# Patient Record
Sex: MALE | Race: WHITE | Employment: FULL TIME | URBAN - METROPOLITAN AREA
[De-identification: names, ages, dates, MRNs, and addresses within clinical notes are randomized per-mention and may not be internally consistent; named-entity substitution may affect disease eponyms.]

---

## 2020-09-22 ENCOUNTER — OFFICE VISIT (OUTPATIENT)
Dept: ENT CLINIC | Age: 30
End: 2020-09-22
Payer: COMMERCIAL

## 2020-09-22 VITALS — RESPIRATION RATE: 20 BRPM | BODY MASS INDEX: 19.95 KG/M2 | HEIGHT: 67 IN | WEIGHT: 127.1 LBS

## 2020-09-22 PROCEDURE — 99203 OFFICE O/P NEW LOW 30 MIN: CPT | Performed by: OTOLARYNGOLOGY

## 2020-09-22 RX ORDER — AZELASTINE 1 MG/ML
2 SPRAY, METERED NASAL 2 TIMES DAILY
Qty: 1 BOTTLE | Refills: 1 | Status: SHIPPED
Start: 2020-09-22 | End: 2022-04-22

## 2020-09-22 ASSESSMENT — ENCOUNTER SYMPTOMS
SINUS PRESSURE: 1
RHINORRHEA: 1
SINUS PAIN: 1

## 2020-09-22 NOTE — PROGRESS NOTES
DEPARTMENT OF OTOLARYNGOLOGY   HISTORY AND PHYSICAL      PATIENT: Yaima Pride THU:[birth year] (27 y.o.)   DATE: September 22, 2020  REFERRED BY: No referring provider defined for this encounter. HISTORY OBTAINED FROM:  patient    CHIEF COMPLAINT:  had concerns including Other (stops breathing throughout the the night for  seconds or 30 seconds or more does not currently see a sleep doctor but has had sinus issues since he was a little kid). HISTORY OF PRESENT ILLNESS:                                                                                        Yaima Pride is a(n) 26 y.o. male presents to the office today as a new patient for evaluation of his sinus congestion. Patient states that he has near daily sinus congestion and drainage that is worse at night. Reports migraine associated headaches as a result of congestion 1-2x per month. Has only tried OTC nasal decongestant. Reports having sinus congestion as a child and underwent imaging but has not had any imaging recently. Reports constant postnasal drip. Denies any specific allergen. Past Medical History:   Diagnosis Date    Asthma     childhood    Kidney stones     Murmur     patient states this was as a child, he no longer has murmur        History reviewed. No pertinent surgical history.       Current Outpatient Medications:     azelastine (ASTELIN) 0.1 % nasal spray, 2 sprays by Nasal route 2 times daily Use in each nostril as directed, Disp: 1 Bottle, Rfl: 1    tamsulosin (FLOMAX) 0.4 MG capsule, Take 1 capsule by mouth daily, Disp: 10 capsule, Rfl: 11    vitamin D (CHOLECALCIFEROL) 1000 UNIT TABS tablet, Take 1,000 Units by mouth daily, Disp: , Rfl:     Biotin 1000 MCG TABS, Take 1 tablet by mouth daily, Disp: , Rfl:     Allergies   Allergen Reactions    Penicillins Anaphylaxis       Family History   Problem Relation Age of Onset    No Known Problems Mother        Social History     Socioeconomic History    Marital status: PHYSICAL EXAM:                                                                                                                Resp 20   Ht 5' 7\" (1.702 m)   Wt 127 lb 1.6 oz (57.7 kg)   BMI 19.91 kg/m²   Physical Exam  Constitutional:       Appearance: Normal appearance. He is normal weight. HENT:      Head: Normocephalic and atraumatic. Right Ear: Tympanic membrane, ear canal and external ear normal.      Left Ear: Tympanic membrane, ear canal and external ear normal.      Nose: Congestion present. No rhinorrhea. Comments: Slight septal deviation to the left     Mouth/Throat:      Mouth: Mucous membranes are moist.      Pharynx: Oropharynx is clear. Eyes:      Pupils: Pupils are equal, round, and reactive to light. Neurological:      Mental Status: He is alert. Constitutional: Appears well-developed and well-nourished. Appears as stated age. Eyes: Lids andlashes normal, pupils equal, extra ocular muscles intact, sclera clear   ENT: Normocephalic, without obvious abnormality, atraumatic, sinuses nontender on palpation, external ears without lesions, oral pharynx with moist mucus membranes, tonsils without erythema or exudates, gums normal and good dentition. Neck:  Supple, symmetrical, trachea midline, no adenopathy, thyroid symmetric, not enlarged and no tenderness, skin normal   Respiratory: No increased work of breathing. Nostridor or wheezes   Cardiovascular: Regular rate   Skin: Warm and dry   Neurologic:  Alert and oriented     ASSESSMENT ANDPLAN:                                                                                                  Diagnosis Orders   1. Chronic sinusitis, unspecified location         26 y.o. [facility] history of  presents with  possible secondary to     · Prescribed azelastine spary, 2 sprays each nostril bid  · Follow up in 3 months or sooner if symptoms acutelyexacerbate    Patient was seen, examined and discussed with attending physician.      Sophie Jones Juanpablo Broderick, MS-IV    [facility] ([facility])  OtolaryngologyResidency  9/22/2020  8:39 AM           Sierra Panda  1990      I have discussed the case, including pertinent history and exam findings with the resident. I have seen and examined the patient and the key elements of the encounter have been performed by me. I agree with the assessment, plan and orders as documented by the resident. Patient here for follow up of medical problems. Remainder of medical problems as per resident note.       [address], DO  10/8/20

## 2022-04-22 ENCOUNTER — OFFICE VISIT (OUTPATIENT)
Dept: ENT CLINIC | Age: 32
End: 2022-04-22
Payer: COMMERCIAL

## 2022-04-22 VITALS
HEART RATE: 59 BPM | DIASTOLIC BLOOD PRESSURE: 77 MMHG | HEIGHT: 67 IN | BODY MASS INDEX: 20.09 KG/M2 | SYSTOLIC BLOOD PRESSURE: 132 MMHG | WEIGHT: 128 LBS

## 2022-04-22 DIAGNOSIS — J34.2 DNS (DEVIATED NASAL SEPTUM): Primary | ICD-10-CM

## 2022-04-22 DIAGNOSIS — J30.9 ALLERGIC RHINITIS, UNSPECIFIED SEASONALITY, UNSPECIFIED TRIGGER: ICD-10-CM

## 2022-04-22 DIAGNOSIS — R09.81 NASAL CONGESTION: ICD-10-CM

## 2022-04-22 PROCEDURE — 99204 OFFICE O/P NEW MOD 45 MIN: CPT | Performed by: NURSE PRACTITIONER

## 2022-04-22 RX ORDER — FLUTICASONE PROPIONATE 50 MCG
2 SPRAY, SUSPENSION (ML) NASAL DAILY
Qty: 16 G | Refills: 1 | Status: SHIPPED | OUTPATIENT
Start: 2022-04-22

## 2022-04-22 ASSESSMENT — ENCOUNTER SYMPTOMS
SHORTNESS OF BREATH: 0
RHINORRHEA: 1
STRIDOR: 0
RESPIRATORY NEGATIVE: 1
EYES NEGATIVE: 1
SINUS PRESSURE: 1

## 2022-04-22 NOTE — PROGRESS NOTES
and palpitations. Endocrine: Negative. Musculoskeletal: Negative. Skin: Negative. Neurological: Negative. Negative for dizziness. Hematological: Negative. Psychiatric/Behavioral: Negative. /77   Pulse 59   Ht 5' 7\" (1.702 m)   Wt 128 lb (58.1 kg)   BMI 20.05 kg/m²   Physical Exam  Constitutional:       Appearance: Normal appearance. HENT:      Head: Normocephalic. Right Ear: Tympanic membrane, ear canal and external ear normal.      Left Ear: Tympanic membrane, ear canal and external ear normal.      Nose: Septal deviation present. Right Turbinates: Pale. Left Turbinates: Pale. Mouth/Throat:      Lips: Pink. Mouth: Mucous membranes are moist.     Eyes:      Conjunctiva/sclera: Conjunctivae normal.      Pupils: Pupils are equal, round, and reactive to light. Cardiovascular:      Rate and Rhythm: Normal rate and regular rhythm. Pulses: Normal pulses. Pulmonary:      Effort: Pulmonary effort is normal. No respiratory distress. Breath sounds: No stridor. Musculoskeletal:         General: Normal range of motion. Cervical back: Normal range of motion. No rigidity. No muscular tenderness. Skin:     General: Skin is warm and dry. Neurological:      General: No focal deficit present. Mental Status: He is alert and oriented to person, place, and time. Psychiatric:         Mood and Affect: Mood normal.         Behavior: Behavior normal.         Thought Content: Thought content normal.         Judgment: Judgment normal.         IMPRESSION/PLAN:    Cici oRsa was seen today for new patient. Diagnoses and all orders for this visit:    DNS (deviated nasal septum)  -     CT SINUS WO CONTRAST; Future    Allergic rhinitis, unspecified seasonality, unspecified trigger    Nasal congestion  -     CT SINUS WO CONTRAST;  Future    Other orders  -     fluticasone (FLONASE) 50 MCG/ACT nasal spray; 2 sprays by Each Nostril route daily      Patient demonstrates considerable leftward nasal septal deviation and will undergo a CT scan at this time for further evaluation. Patient states he has used nasal strips in the past with good relief with the patient possibly benefiting from [place] aw[place] or batten grafts in the future depending on need for other surgical intervention. At this time he will be placed on Flonase, 2 sprays each nostril once daily for allergic rhinitis and hypertrophy of the inferior turbinates. Also recommended for him to begin nasal saline spray, 2 sprays each nostril 3-4 times daily as well as nasal irrigation once daily. He will follow-up in 6 weeks. He is instructed to call with any new or worsening symptoms prior to his next appointment.         Mandy Cox, JASSI, FNP-C  [facility], Nose and Throat    The information contained in this note has been dictated using drug and medical speech recognition software and may contain errors

## 2022-04-29 ENCOUNTER — HOSPITAL ENCOUNTER (OUTPATIENT)
Dept: CT IMAGING | Age: 32
Discharge: HOME | End: 2022-05-01
Payer: COMMERCIAL

## 2022-04-29 DIAGNOSIS — R09.81 NASAL CONGESTION: ICD-10-CM

## 2022-04-29 DIAGNOSIS — J34.2 DNS (DEVIATED NASAL SEPTUM): ICD-10-CM

## 2022-04-29 PROCEDURE — 70486 CT MAXILLOFACIAL W/O DYE: CPT

## 2022-05-13 ENCOUNTER — TELEPHONE (OUTPATIENT)
Dept: ENT CLINIC | Age: 32
End: 2022-05-13

## 2022-05-13 NOTE — TELEPHONE ENCOUNTER
Shows DNS with enlarged turbinates and right maxillary sinusitis.   Will discuss options at his follow up

## 2022-06-03 ENCOUNTER — OFFICE VISIT (OUTPATIENT)
Dept: ENT CLINIC | Age: 32
End: 2022-06-03
Payer: COMMERCIAL

## 2022-06-03 VITALS
HEIGHT: 67 IN | HEART RATE: 51 BPM | BODY MASS INDEX: 20.09 KG/M2 | DIASTOLIC BLOOD PRESSURE: 76 MMHG | SYSTOLIC BLOOD PRESSURE: 127 MMHG | WEIGHT: 128 LBS

## 2022-06-03 DIAGNOSIS — J34.2 DNS (DEVIATED NASAL SEPTUM): Primary | ICD-10-CM

## 2022-06-03 DIAGNOSIS — J32.0 CHRONIC RIGHT MAXILLARY SINUSITIS: ICD-10-CM

## 2022-06-03 DIAGNOSIS — J30.9 ALLERGIC RHINITIS, UNSPECIFIED SEASONALITY, UNSPECIFIED TRIGGER: ICD-10-CM

## 2022-06-03 DIAGNOSIS — J34.89 NASAL VALVE COLLAPSE: ICD-10-CM

## 2022-06-03 PROCEDURE — 99214 OFFICE O/P EST MOD 30 MIN: CPT | Performed by: NURSE PRACTITIONER

## 2022-06-03 RX ORDER — AZELASTINE 1 MG/ML
1-2 SPRAY, METERED NASAL 2 TIMES DAILY PRN
Qty: 30 ML | Refills: 1 | Status: SHIPPED | OUTPATIENT
Start: 2022-06-03

## 2022-06-03 ASSESSMENT — ENCOUNTER SYMPTOMS
STRIDOR: 0
RHINORRHEA: 1
SINUS PRESSURE: 1
EYES NEGATIVE: 1
RESPIRATORY NEGATIVE: 1
SHORTNESS OF BREATH: 0

## 2022-06-03 NOTE — PATIENT INSTRUCTIONS
SURGERY:_____/_____/_____    Nothing to eat or drink after midnight the night before surgery unless surgery is at [facility] or otherwise instructed by the hospital.    DO NOT TAKE ANY ASPIRIN PRODUCTS 7 days prior to surgery. Tylenol only. No Advil, Motrin, Aleve, or Ibuprofen. IF YOU ARE ON BLOOD THINNERS (PLAVIX, COUMADIN, ELIQUIS ETC) THESE WILL ALSO NEED TO BE HELD. Any illegal drugs in your system (including Marijuana even if legally prescribed) will result in your surgery being cancelled. Please be sure to check with our office or the hospital on time frame for the drugs to be out of your system. SHOULD YOUR INSURANCE CHANGE AT ANY TIME YOU MUST CONTACT OUR OFFICE. FAILURE TO DO SO MAY RESULT IN YOUR SURGERY BEING RESCHEDULED OR YOU MAY BE CHARGED AS SELF-PAY. Due to the high demand for surgery at our practice, if you cancel or reschedule your surgery two (2) times we may not reschedule you. If you need FMLA or Short Term Disability paperwork completed for your surgery, please complete your portion, ensure your name and date of birth are on them and fax them to [phone] asap. Paperwork can take up to 2 weeks to be completed. Per current [facility] protocols, COVID Testing is NOT required prior to procedure UNLESS you are symptomatic. (Vaccinated or Unvaccinated)- [facility] requires COVID Testing 72 hours prior to surgery. If you need medical clearance, you are responsible to contact your physician(s) to schedule the appointment. If clearance is not completed within 30 days of your surgery it may be cancelled. Our office will fax the appropriate forms that need to be completed to your physician(s).     The location of your surgery will call you the day prior to your surgery date to let you know what time you have to be there and any other details. (they usually don't call until late afternoon- early evening.)- IF YOU HAVE QUESTIONS REGARDING THE TIME OF YOUR SURGERY, PLEASE CALL THE FACILITY YOU ARE SCHEDULED AT.     ·       [address], [address] [facility], [facility], [address] [place] [address] Dr will call you a couple days prior to surgery and give you further instructions, if you have any questions, you can reach them at [phone]    ·       [address], [address], KENDY FERNÁNDEZ [facility], [address] [place] [address] Dr will call you a couple days prior to surgery and give you further instructions, if you have any questions, you can reach them at [phone]    ·       [facility], [address] [facility], [address] [place] [address] Dr will call you a couple days prior to surgery and give you further instructions, if you have any questions, you can reach them at [phone]     ·       [facility]), [address],  KENDY FERNÁNDEZ [facility], [address] [place] [address] will call you a couple days prior to surgery and give you further instructions, if you have any questions, you can reach them at [phone]      Pre-Surgery/Anesthesia Video ([facility] ONLY)  Located on [address]:  1. Scroll over Health Information  2. Select Audio and Video  3. Select [employer]  4. Select Your child and Anesthesia  5.  Select Pre surgery [facility]    FOOD RESTRICTIONS--[facility] ONLY  Solid Food/Milk Products --------- Stop 8 hours prior to Surgery  Formula --------- Stop 6 hours prior to Surgery  Breast Milk ------- Stop 4 hours prior to Surgery  Clear liquids (water, Gatorade, Pedialyte) - Stop 2 hours prior to Surgery

## 2022-06-03 NOTE — PROGRESS NOTES
[facility] Otolaryngology  Dr. Chika Irwin. Kelsey Hussein. Ms.Ed        Patient Name:  Ellen Landaverde  :  1990     CHIEF C/O:    Chief Complaint   Patient presents with    Follow-up     6 wk sinus CT,        HISTORY OBTAINED FROM:  patient    HISTORY OF PRESENT ILLNESS:       Ofelia Garg is a 27y.o. year old male, here today for follow up of sinus congestion and CT sinus results. Patient was last seen 6 weeks ago and placed on Flonase. He states since starting the medication he did notice mild improvement in his congestion symptoms with decreased sinus pressure. CT of the sinuses do show right maxillary sinusitis with leftward nasal septal deviation. Patient denies any recent fevers or recent antibiotics. He denies any significant rhinorrhea or postnasal drainage at this time. He continues to have periods of significant congestion through the left nostril throughout the day. Patient is interested in surgical intervention if it is an option. Past Medical History:   Diagnosis Date    Asthma     childhood    Kidney stones     Murmur     patient states this was as a child, he no longer has murmur     History reviewed. No pertinent surgical history. Current Outpatient Medications:     azelastine (ASTELIN) 0.1 % nasal spray, 1-2 sprays by Nasal route 2 times daily as needed for Rhinitis Use in each nostril as directed, Disp: 30 mL, Rfl: 1    fluticasone (FLONASE) 50 MCG/ACT nasal spray, 2 sprays by Each Nostril route daily, Disp: 16 g, Rfl: 1    vitamin D (CHOLECALCIFEROL) 1000 UNIT TABS tablet, Take 1,000 Units by mouth daily, Disp: , Rfl:   Penicillins  Social History     Tobacco Use    Smoking status: Never Smoker    Smokeless tobacco: Never Used   Substance Use Topics    Alcohol use: Yes     Comment: soc.  Drug use: No     Family History   Problem Relation Age of Onset    No Known Problems Mother        Review of Systems   Constitutional: Negative.   Negative for activity change and appetite change. HENT: Positive for congestion, postnasal drip, rhinorrhea and sinus pressure. Eyes: Negative. Respiratory: Negative. Negative for shortness of breath and stridor. Cardiovascular: Negative. Negative for chest pain and palpitations. Endocrine: Negative. Musculoskeletal: Negative. Skin: Negative. Neurological: Negative. Negative for dizziness. Hematological: Negative. Psychiatric/Behavioral: Negative. /76   Pulse 51   Ht 5' 7\" (1.702 m)   Wt 128 lb (58.1 kg)   BMI 20.05 kg/m²   Physical Exam  Constitutional:       Appearance: Normal appearance. HENT:      Head: Normocephalic. Right Ear: Tympanic membrane, ear canal and external ear normal.      Left Ear: Tympanic membrane, ear canal and external ear normal.      Nose: Septal deviation present. Right Turbinates: Pale. Left Turbinates: Pale. Mouth/Throat:      Lips: Pink. Mouth: Mucous membranes are moist.     Eyes:      Conjunctiva/sclera: Conjunctivae normal.      Pupils: Pupils are equal, round, and reactive to light. Cardiovascular:      Rate and Rhythm: Normal rate and regular rhythm. Pulses: Normal pulses. Pulmonary:      Effort: Pulmonary effort is normal. No respiratory distress. Breath sounds: No stridor. Musculoskeletal:         General: Normal range of motion. Cervical back: Normal range of motion. No rigidity. No muscular tenderness. Skin:     General: Skin is warm and dry. Neurological:      General: No focal deficit present. Mental Status: He is alert and oriented to person, place, and time. Psychiatric:         Mood and Affect: Mood normal.         Behavior: Behavior normal.         Thought Content: Thought content normal.         Judgment: Judgment normal.     CT Sinus:      Impression   1. Right maxillary sinusitis. 2. Mild leftward deviation of the nasal septum.              IMPRESSION/PLAN:    Timothy Cerrato was seen today for follow-up. Diagnoses and all orders for this visit:    DNS (deviated nasal septum)    Chronic right maxillary sinusitis    Nasal valve collapse    Allergic rhinitis, unspecified seasonality, unspecified trigger    Other orders  -     azelastine (ASTELIN) 0.1 % nasal spray; 1-2 sprays by Nasal route 2 times daily as needed for Rhinitis Use in each nostril as directed    Patient will continue with Flonase, 2 sprays each nostril once daily and be given Astelin spray, 1 to 2 sprays each nostril twice daily as needed for continuing congestion symptoms. CT of the sinuses is reviewed with the patient showing chronic right maxillary sinusitis with leftward nasal septal deviation. Patient continues to have symptoms of the left nasal valve collapse. At this time it is recommended that the patient may benefit from functional endoscopic sinus surgery with nasal septoplasty and submucosal resection of the inferior turbinates. Patient may also benefit from possible left batten graft versus left Latera. Risks and benefits of the procedure are discussed with the patient who wishes to proceed. He will be scheduled with Dr. Demetrio Miller at [facility] surgery center for his procedure with follow-up 1 week after his procedure. He is instructed to call with any new or worsening of symptoms prior to this time.       Ese Martinez, JASSI, FNP-C  8 [facility], Nose and Throat    The information contained in this note has been dictated using drug and medical speech recognition software and may contain errors

## 2022-06-23 ENCOUNTER — TELEPHONE (OUTPATIENT)
Dept: ENT CLINIC | Age: 32
End: 2022-06-23

## 2022-06-23 NOTE — TELEPHONE ENCOUNTER
Prior Authorization Form:      DEMOGRAPHICS:                     Patient Name:  Ileana Freeman  Patient :  1990            Insurance:  Payor: Mesha Iniguez / Plan: Mesha Iniguez [place] LOCAL / Product Type: *No Product type* /   Insurance ID Number:    Payor/Plan Subscr  Sex Relation Sub. Ins. ID Effective Group Num   1.  [employer] [place]* THOM JAMIL* 1990 Male Self [id number]* 3/16/22 [id number]                                    [id number]         DIAGNOSIS & PROCEDURE:                       Procedure/Operation: FUNCTIONAL ENDOSCOPIC SINUS SURGERY, SUBMUCOSAL RESECTION OF INFERIOR NASAL TURBINATES, SEPTOPLASTY, LEFT LATERA           CPT Code: 12032 83890 75849 13578 09544 34108 09091 47977 52922 37999 77049 [id number] 42309    Diagnosis:  RIGHT MAXILLARY SINUSITIS, DEVIATED NASAL SEPTUM, NASAL VALVE COLLAPSE, HYPERTROPHY OF INFERIOR NASAL TURBINATES    ICD10 Code: J32.0 J34.2 J34.89 J34.3    Location:  [facility]    Surgeon:  Cori Ferro INFORMATION:                          Date: 23    Time: N/A              Anesthesia:  General                                                       Status:  Outpatient        Special Comments:  N/A   **RESCHEDULE FROM 12/15/22**    Electronically signed by Enoch Jimenez MA on 2022 at 10:12 AM

## 2022-12-08 ENCOUNTER — PREP FOR PROCEDURE (OUTPATIENT)
Dept: ENT CLINIC | Age: 32
End: 2022-12-08

## 2022-12-08 PROBLEM — J34.829 NASAL VALVE COLLAPSE: Status: ACTIVE | Noted: 2022-12-08

## 2022-12-08 PROBLEM — J34.3 HYPERTROPHY OF NASAL TURBINATES: Status: ACTIVE | Noted: 2022-12-08

## 2022-12-08 PROBLEM — J34.2 DNS (DEVIATED NASAL SEPTUM): Status: ACTIVE | Noted: 2022-12-08

## 2022-12-08 PROBLEM — J32.0 CHRONIC RIGHT MAXILLARY SINUSITIS: Status: ACTIVE | Noted: 2022-12-08

## 2022-12-08 PROBLEM — J34.89 NASAL VALVE COLLAPSE: Status: ACTIVE | Noted: 2022-12-08

## 2022-12-08 PROBLEM — M95.0 NASAL VALVE COLLAPSE: Status: ACTIVE | Noted: 2022-12-08

## 2023-01-11 ENCOUNTER — ANESTHESIA EVENT (OUTPATIENT)
Dept: OPERATING ROOM | Age: 33
End: 2023-01-11
Payer: COMMERCIAL

## 2023-01-11 NOTE — ANESTHESIA PRE PROCEDURE
Department of Anesthesiology  Preprocedure Note       Name:  Mira Summers   Age:  27 y.o.  :  1990                                          MRN:  [id number]         Date:  2023      Surgeon: Dorothy Luevano):  Dori Washington DO    Procedure: Procedure(s):  FUNCTIONAL ENDOSCOPIC SINUS SURGERY  SEPTOPLASTY, SUBMUCOUS RESECTION INFERIOR NASAL TURBINATES  LEFT LATERA    Medications prior to admission:   Prior to Admission medications    Not on File       Current medications:    No current facility-administered medications for this encounter. No current outpatient medications on file. Allergies: Allergies   Allergen Reactions    Penicillins Anaphylaxis       Problem List:    Patient Active Problem List   Diagnosis Code    Chronic right maxillary sinusitis J32.0    DNS (deviated nasal septum) J34.2    Nasal valve collapse J34.89    Hypertrophy of nasal turbinates J34.3       Past Medical History:        Diagnosis Date    Asthma     childhood    Kidney stones     Murmur     patient states this was as a child, he no longer has murmur       Past Surgical History:        Procedure Laterality Date    DENTAL SURGERY      LITHOTRIPSY         Social History:    Social History     Tobacco Use    Smoking status: Never    Smokeless tobacco: Never   Substance Use Topics    Alcohol use: Yes     Comment: rare                                Counseling given: Not Answered      Vital Signs (Current):   Vitals:    23 1542   Weight: 130 lb (59 kg)   Height: 5' 7\" (1.702 m)                                              BP Readings from Last 3 Encounters:   22 127/76   22 132/77   17 129/71       NPO Status:  >8 hours                                                                               BMI:   Wt Readings from Last 3 Encounters:   22 128 lb (58.1 kg)   22 128 lb (58.1 kg)   20 127 lb 1.6 oz (57.7 kg)     Body mass index is 20.36 kg/m².     CBC:   Lab Results   Component Value Date/Time    WBC 7.4 04/18/2017 02:00 PM    RBC 4.86 04/18/2017 02:00 PM    HGB 15.6 04/18/2017 02:00 PM    HCT 43.9 04/18/2017 02:00 PM    MCV 90.3 04/18/2017 02:00 PM    RDW 13.0 04/18/2017 02:00 PM     04/18/2017 02:00 PM       CMP:   Lab Results   Component Value Date/Time     04/18/2017 02:00 PM    K 5.5 04/18/2017 02:00 PM     04/18/2017 02:00 PM    CO2 25 04/18/2017 02:00 PM    BUN 18 04/18/2017 02:00 PM    CREATININE 1.1 04/18/2017 02:00 PM    GFRAA >60 04/18/2017 02:00 PM    LABGLOM >60 04/18/2017 02:00 PM    GLUCOSE 86 04/18/2017 02:00 PM    PROT 7.1 04/18/2017 02:00 PM    CALCIUM 10.0 04/18/2017 02:00 PM    BILITOT 0.7 04/18/2017 02:00 PM    ALKPHOS 70 04/18/2017 02:00 PM    AST 23 04/18/2017 02:00 PM    ALT 13 04/18/2017 02:00 PM       POC Tests: No results for input(s): POCGLU, POCNA, POCK, POCCL, POCBUN, POCHEMO, POCHCT in the last 72 hours.     Coags: No results found for: PROTIME, INR, APTT    HCG (If Applicable): No results found for: PREGTESTUR, PREGSERUM, HCG, HCGQUANT     ABGs: No results found for: PHART, PO2ART, OBK3HCW, BJQ0VOZ, BEART, A7EXTWKM     Type & Screen (If Applicable):  No results found for: LABABO, LABRH    Drug/Infectious Status (If Applicable):  No results found for: HIV, HEPCAB    COVID-19 Screening (If Applicable): No results found for: COVID19        Anesthesia Evaluation  Patient summary reviewed and Nursing notes reviewed  Airway: Mallampati: II  TM distance: >3 FB   Neck ROM: full  Mouth opening: > = 3 FB   Dental: normal exam         Pulmonary: breath sounds clear to auscultation  (+) asthma (childhood):                            Cardiovascular:    (+) valvular problems/murmurs:,         Rhythm: regular                      Neuro/Psych:   Negative Neuro/Psych ROS              GI/Hepatic/Renal:   (+) renal disease: kidney stones,           Endo/Other: Negative Endo/Other ROS                    Abdominal:             Vascular: negative vascular ROS. Other Findings:             Андрей Crum MD   1/11/2023    DOS STAFF ADDENDUM:    Pt seen and examined, physical exam updated, chart reviewed including anesthesia, drug and allergy history. H&P reviewed. No interval changes to history or physical examination (unless noted above). NPO status confirmed. Anesthetic plan, risks, benefits, alternatives discussed with patient. Patient verbalized an understanding and agrees to proceed.      Kira Duran DO  Staff Anesthesiologist  11:01 AM

## 2023-01-12 ENCOUNTER — HOSPITAL ENCOUNTER (OUTPATIENT)
Age: 33
Setting detail: OUTPATIENT SURGERY
Discharge: HOME | End: 2023-01-12
Attending: OTOLARYNGOLOGY | Admitting: OTOLARYNGOLOGY
Payer: COMMERCIAL

## 2023-01-12 ENCOUNTER — ANESTHESIA (OUTPATIENT)
Dept: OPERATING ROOM | Age: 33
End: 2023-01-12
Payer: COMMERCIAL

## 2023-01-12 VITALS
OXYGEN SATURATION: 98 % | HEIGHT: 67 IN | HEART RATE: 64 BPM | RESPIRATION RATE: 14 BRPM | TEMPERATURE: 97 F | BODY MASS INDEX: 20.09 KG/M2 | DIASTOLIC BLOOD PRESSURE: 68 MMHG | SYSTOLIC BLOOD PRESSURE: 129 MMHG | WEIGHT: 128 LBS

## 2023-01-12 DIAGNOSIS — J32.0 CHRONIC RIGHT MAXILLARY SINUSITIS: ICD-10-CM

## 2023-01-12 DIAGNOSIS — J34.2 DNS (DEVIATED NASAL SEPTUM): ICD-10-CM

## 2023-01-12 DIAGNOSIS — J34.89 NASAL VALVE COLLAPSE: ICD-10-CM

## 2023-01-12 DIAGNOSIS — G89.18 POST-OP PAIN: Primary | ICD-10-CM

## 2023-01-12 DIAGNOSIS — J34.3 HYPERTROPHY OF NASAL TURBINATES: ICD-10-CM

## 2023-01-12 PROCEDURE — 6360000002 HC RX W HCPCS: Performed by: NURSE ANESTHETIST, CERTIFIED REGISTERED

## 2023-01-12 PROCEDURE — 2720000010 HC SURG SUPPLY STERILE: Performed by: OTOLARYNGOLOGY

## 2023-01-12 PROCEDURE — 2580000003 HC RX 258: Performed by: ANESTHESIOLOGY

## 2023-01-12 PROCEDURE — 30140 RESECT INFERIOR TURBINATE: CPT | Performed by: OTOLARYNGOLOGY

## 2023-01-12 PROCEDURE — 3700000000 HC ANESTHESIA ATTENDED CARE: Performed by: OTOLARYNGOLOGY

## 2023-01-12 PROCEDURE — 31297 NSL/SINS NDSC SURG SPHN SINS: CPT | Performed by: OTOLARYNGOLOGY

## 2023-01-12 PROCEDURE — 2500000003 HC RX 250 WO HCPCS: Performed by: NURSE ANESTHETIST, CERTIFIED REGISTERED

## 2023-01-12 PROCEDURE — 7100000010 HC PHASE II RECOVERY - FIRST 15 MIN: Performed by: OTOLARYNGOLOGY

## 2023-01-12 PROCEDURE — 31295 NSL/SINS NDSC SURG MAX SINS: CPT | Performed by: OTOLARYNGOLOGY

## 2023-01-12 PROCEDURE — 3700000001 HC ADD 15 MINUTES (ANESTHESIA): Performed by: OTOLARYNGOLOGY

## 2023-01-12 PROCEDURE — 31298 NSL/SINS NDSC SURG FRNT&SPHN: CPT | Performed by: OTOLARYNGOLOGY

## 2023-01-12 PROCEDURE — 3600000014 HC SURGERY LEVEL 4 ADDTL 15MIN: Performed by: OTOLARYNGOLOGY

## 2023-01-12 PROCEDURE — 30468 RPR NSL VLV COLLAPSE W/IMPLT: CPT | Performed by: OTOLARYNGOLOGY

## 2023-01-12 PROCEDURE — C1726 CATH, BAL DIL, NON-VASCULAR: HCPCS | Performed by: OTOLARYNGOLOGY

## 2023-01-12 PROCEDURE — 3600000004 HC SURGERY LEVEL 4 BASE: Performed by: OTOLARYNGOLOGY

## 2023-01-12 PROCEDURE — C2625 STENT, NON-COR, TEM W/DEL SY: HCPCS | Performed by: OTOLARYNGOLOGY

## 2023-01-12 PROCEDURE — 7100000011 HC PHASE II RECOVERY - ADDTL 15 MIN: Performed by: OTOLARYNGOLOGY

## 2023-01-12 PROCEDURE — 31254 NSL/SINS NDSC W/PRTL ETHMDCT: CPT | Performed by: OTOLARYNGOLOGY

## 2023-01-12 PROCEDURE — 7100000001 HC PACU RECOVERY - ADDTL 15 MIN: Performed by: OTOLARYNGOLOGY

## 2023-01-12 PROCEDURE — 30520 REPAIR OF NASAL SEPTUM: CPT | Performed by: OTOLARYNGOLOGY

## 2023-01-12 PROCEDURE — 6370000000 HC RX 637 (ALT 250 FOR IP): Performed by: ANESTHESIOLOGY

## 2023-01-12 PROCEDURE — 7100000000 HC PACU RECOVERY - FIRST 15 MIN: Performed by: OTOLARYNGOLOGY

## 2023-01-12 PROCEDURE — 2709999900 HC NON-CHARGEABLE SUPPLY: Performed by: OTOLARYNGOLOGY

## 2023-01-12 DEVICE — IMPLANTABLE DEVICE: Type: IMPLANTABLE DEVICE | Site: NOSE | Status: FUNCTIONAL

## 2023-01-12 DEVICE — PROPEL MINI SINUS IMPLANT
Type: IMPLANTABLE DEVICE | Site: NOSE | Status: FUNCTIONAL
Brand: PROPEL MINI

## 2023-01-12 RX ORDER — PROPOFOL 10 MG/ML
INJECTION, EMULSION INTRAVENOUS PRN
Status: DISCONTINUED | OUTPATIENT
Start: 2023-01-12 | End: 2023-01-12 | Stop reason: SDUPTHER

## 2023-01-12 RX ORDER — SODIUM CHLORIDE 0.9 % (FLUSH) 0.9 %
5-40 SYRINGE (ML) INJECTION EVERY 12 HOURS SCHEDULED
Status: DISCONTINUED | OUTPATIENT
Start: 2023-01-12 | End: 2023-01-12 | Stop reason: HOSPADM

## 2023-01-12 RX ORDER — SODIUM CHLORIDE 0.9 % (FLUSH) 0.9 %
5-40 SYRINGE (ML) INJECTION PRN
Status: DISCONTINUED | OUTPATIENT
Start: 2023-01-12 | End: 2023-01-12 | Stop reason: HOSPADM

## 2023-01-12 RX ORDER — FENTANYL CITRATE 0.05 MG/ML
25 INJECTION, SOLUTION INTRAMUSCULAR; INTRAVENOUS EVERY 5 MIN PRN
Status: DISCONTINUED | OUTPATIENT
Start: 2023-01-12 | End: 2023-01-12 | Stop reason: HOSPADM

## 2023-01-12 RX ORDER — ONDANSETRON 4 MG/1
4 TABLET, FILM COATED ORAL 3 TIMES DAILY PRN
Qty: 15 TABLET | Refills: 0 | Status: SHIPPED | OUTPATIENT
Start: 2023-01-12

## 2023-01-12 RX ORDER — GLYCOPYRROLATE 0.2 MG/ML
INJECTION INTRAMUSCULAR; INTRAVENOUS PRN
Status: DISCONTINUED | OUTPATIENT
Start: 2023-01-12 | End: 2023-01-12 | Stop reason: SDUPTHER

## 2023-01-12 RX ORDER — FENTANYL CITRATE 50 UG/ML
INJECTION, SOLUTION INTRAMUSCULAR; INTRAVENOUS PRN
Status: DISCONTINUED | OUTPATIENT
Start: 2023-01-12 | End: 2023-01-12 | Stop reason: SDUPTHER

## 2023-01-12 RX ORDER — LIDOCAINE HYDROCHLORIDE 20 MG/ML
INJECTION, SOLUTION INTRAVENOUS PRN
Status: DISCONTINUED | OUTPATIENT
Start: 2023-01-12 | End: 2023-01-12 | Stop reason: SDUPTHER

## 2023-01-12 RX ORDER — HYDROCODONE BITARTRATE AND ACETAMINOPHEN 5; 325 MG/1; MG/1
1 TABLET ORAL ONCE
Status: COMPLETED | OUTPATIENT
Start: 2023-01-12 | End: 2023-01-12

## 2023-01-12 RX ORDER — HYDROMORPHONE HYDROCHLORIDE 1 MG/ML
0.5 INJECTION, SOLUTION INTRAMUSCULAR; INTRAVENOUS; SUBCUTANEOUS EVERY 5 MIN PRN
Status: DISCONTINUED | OUTPATIENT
Start: 2023-01-12 | End: 2023-01-12 | Stop reason: HOSPADM

## 2023-01-12 RX ORDER — OXYCODONE HYDROCHLORIDE AND ACETAMINOPHEN 5; 325 MG/1; MG/1
1 TABLET ORAL ONCE
Status: DISCONTINUED | OUTPATIENT
Start: 2023-01-12 | End: 2023-01-12 | Stop reason: HOSPADM

## 2023-01-12 RX ORDER — ROCURONIUM BROMIDE 10 MG/ML
INJECTION, SOLUTION INTRAVENOUS PRN
Status: DISCONTINUED | OUTPATIENT
Start: 2023-01-12 | End: 2023-01-12 | Stop reason: SDUPTHER

## 2023-01-12 RX ORDER — MIDAZOLAM HYDROCHLORIDE 1 MG/ML
INJECTION INTRAMUSCULAR; INTRAVENOUS PRN
Status: DISCONTINUED | OUTPATIENT
Start: 2023-01-12 | End: 2023-01-12 | Stop reason: SDUPTHER

## 2023-01-12 RX ORDER — NEOSTIGMINE METHYLSULFATE 1 MG/ML
INJECTION, SOLUTION INTRAVENOUS PRN
Status: DISCONTINUED | OUTPATIENT
Start: 2023-01-12 | End: 2023-01-12 | Stop reason: SDUPTHER

## 2023-01-12 RX ORDER — ONDANSETRON 2 MG/ML
INJECTION INTRAMUSCULAR; INTRAVENOUS PRN
Status: DISCONTINUED | OUTPATIENT
Start: 2023-01-12 | End: 2023-01-12 | Stop reason: SDUPTHER

## 2023-01-12 RX ORDER — DEXAMETHASONE SODIUM PHOSPHATE 10 MG/ML
INJECTION, SOLUTION INTRAMUSCULAR; INTRAVENOUS PRN
Status: DISCONTINUED | OUTPATIENT
Start: 2023-01-12 | End: 2023-01-12 | Stop reason: SDUPTHER

## 2023-01-12 RX ORDER — SODIUM CHLORIDE, SODIUM LACTATE, POTASSIUM CHLORIDE, CALCIUM CHLORIDE 600; 310; 30; 20 MG/100ML; MG/100ML; MG/100ML; MG/100ML
INJECTION, SOLUTION INTRAVENOUS CONTINUOUS
Status: DISCONTINUED | OUTPATIENT
Start: 2023-01-12 | End: 2023-01-12 | Stop reason: HOSPADM

## 2023-01-12 RX ORDER — DIPHENHYDRAMINE HYDROCHLORIDE 50 MG/ML
INJECTION INTRAMUSCULAR; INTRAVENOUS PRN
Status: DISCONTINUED | OUTPATIENT
Start: 2023-01-12 | End: 2023-01-12 | Stop reason: SDUPTHER

## 2023-01-12 RX ORDER — SODIUM CHLORIDE 9 MG/ML
INJECTION, SOLUTION INTRAVENOUS PRN
Status: DISCONTINUED | OUTPATIENT
Start: 2023-01-12 | End: 2023-01-12 | Stop reason: HOSPADM

## 2023-01-12 RX ORDER — HYDROCODONE BITARTRATE AND ACETAMINOPHEN 5; 325 MG/1; MG/1
1 TABLET ORAL EVERY 6 HOURS PRN
Qty: 28 TABLET | Refills: 0 | Status: SHIPPED | OUTPATIENT
Start: 2023-01-12 | End: 2023-01-19

## 2023-01-12 RX ORDER — KETAMINE HYDROCHLORIDE 10 MG/ML
INJECTION INTRAMUSCULAR; INTRAVENOUS PRN
Status: DISCONTINUED | OUTPATIENT
Start: 2023-01-12 | End: 2023-01-12 | Stop reason: SDUPTHER

## 2023-01-12 RX ADMIN — FENTANYL CITRATE 50 MCG: 50 INJECTION, SOLUTION INTRAMUSCULAR; INTRAVENOUS at 12:41

## 2023-01-12 RX ADMIN — SODIUM CHLORIDE, POTASSIUM CHLORIDE, SODIUM LACTATE AND CALCIUM CHLORIDE: 600; 310; 30; 20 INJECTION, SOLUTION INTRAVENOUS at 10:14

## 2023-01-12 RX ADMIN — SODIUM CHLORIDE, POTASSIUM CHLORIDE, SODIUM LACTATE AND CALCIUM CHLORIDE: 600; 310; 30; 20 INJECTION, SOLUTION INTRAVENOUS at 13:01

## 2023-01-12 RX ADMIN — HYDROCODONE BITARTRATE AND ACETAMINOPHEN 1 TABLET: 5; 325 TABLET ORAL at 15:26

## 2023-01-12 RX ADMIN — ROCURONIUM BROMIDE 50 MG: 10 INJECTION INTRAVENOUS at 12:41

## 2023-01-12 RX ADMIN — LIDOCAINE HYDROCHLORIDE 50 MG: 20 INJECTION, SOLUTION INTRAVENOUS at 12:41

## 2023-01-12 RX ADMIN — MIDAZOLAM 2 MG: 1 INJECTION INTRAMUSCULAR; INTRAVENOUS at 12:33

## 2023-01-12 RX ADMIN — FENTANYL CITRATE 50 MCG: 50 INJECTION, SOLUTION INTRAMUSCULAR; INTRAVENOUS at 13:18

## 2023-01-12 RX ADMIN — DEXAMETHASONE SODIUM PHOSPHATE 10 MG: 10 INJECTION, SOLUTION INTRAMUSCULAR; INTRAVENOUS at 12:58

## 2023-01-12 RX ADMIN — ONDANSETRON 4 MG: 2 INJECTION INTRAMUSCULAR; INTRAVENOUS at 14:06

## 2023-01-12 RX ADMIN — DIPHENHYDRAMINE HYDROCHLORIDE 12.5 MG: 50 INJECTION, SOLUTION INTRAMUSCULAR; INTRAVENOUS at 14:06

## 2023-01-12 RX ADMIN — FENTANYL CITRATE 100 MCG: 50 INJECTION, SOLUTION INTRAMUSCULAR; INTRAVENOUS at 13:27

## 2023-01-12 RX ADMIN — KETAMINE HYDROCHLORIDE 30 MG: 10 INJECTION INTRAMUSCULAR; INTRAVENOUS at 12:41

## 2023-01-12 RX ADMIN — ROCURONIUM BROMIDE 10 MG: 10 INJECTION INTRAVENOUS at 13:42

## 2023-01-12 RX ADMIN — Medication 3 MG: at 14:06

## 2023-01-12 RX ADMIN — PROPOFOL 200 MG: 10 INJECTION, EMULSION INTRAVENOUS at 12:41

## 2023-01-12 RX ADMIN — GLYCOPYRROLATE 0.6 MG: 0.2 INJECTION INTRAMUSCULAR; INTRAVENOUS at 14:06

## 2023-01-12 RX ADMIN — FENTANYL CITRATE 50 MCG: 50 INJECTION, SOLUTION INTRAMUSCULAR; INTRAVENOUS at 12:38

## 2023-01-12 ASSESSMENT — PAIN DESCRIPTION - LOCATION: LOCATION: HEAD

## 2023-01-12 ASSESSMENT — PAIN - FUNCTIONAL ASSESSMENT: PAIN_FUNCTIONAL_ASSESSMENT: NONE - DENIES PAIN

## 2023-01-12 ASSESSMENT — PAIN SCALES - GENERAL: PAINLEVEL_OUTOF10: 5

## 2023-01-12 NOTE — OP NOTE
Operative Note      Patient: Rich Tolbert  YOB: 1990  MRN: [id number]    Date of Procedure: 1/12/2023    Pre-Op Diagnosis: Chronic right maxillary sinusitis [J32.0]  DNS (deviated nasal septum) [J34.2]  Nasal valve collapse [J34.89]  Hypertrophy of nasal turbinates [J34.3]    Post-Op Diagnosis: Same       Procedure(s):  FUNCTIONAL ENDOSCOPIC SINUS SURGERY  SEPTOPLASTY, SUBMUCOUS RESECTION INFERIOR NASAL TURBINATES  LEFT LATERA    Surgeon(s):  Elicia Avendaño DO    Assistant:   Resident: Hussain Butler DO    Anesthesia: General    Estimated Blood Loss (mL): less than 378     Complications: None    Specimens:   * No specimens in log *    Implants:  Implant Name Type Inv. Item Serial No.  Lot No. LRB No. Used Action   IMPLANT NASAL LATERA 24MM - [id number]  IMPLANT NASAL LATERA 24MM  [employer]WD [id number] Right 1 Implanted   STENT NSL L16MM DIA4MM 370UG MINI MOMETASONE FUROATE LO - [id number]  STENT NSL L16MM DIA4MM 370UG MINI MOMETASONE FUROATE LO [id number] INTERSECT ENT-WD  Right 2 Implanted         Drains: * No LDAs found *    Findings: enlarged inferior turbinates, deviated septum, bilateral middle turbinates inserting at the uncinate with obstructed OMCs    Detailed Description of Procedure:       Prep  The patient was consented preoperatively and taken back to the operating room, identified appropriately, placed in the supine position, given anesthesia for general intubation. The patient was intubated. They were prepped and draped in a sterile fashion. Initial prep for the nose was 4% cocaine pledgets placed into both nasal cavities and the patient's nasal walls medial and lateral along the septal line and then along the inferior turbinate were injected with approximately 10mL of 1% lidocaine with epinephrine. That was total for both sides. The pledgets were allowed to sit for approximately 5 minutes, while the rest of the patient's prep was done. Septoplasty  Starting on the left side, using a #15 blade, an incision was made in the   junction of the nasal valve on the nasal mucosa. The nasal septum was then   presented out into the field. The incision was cut down to the septal   cartilage and then the perichondrial flap was then elevated on the left side   of the nasal septal cartilage. Once under the perichondrium, a 30-degree   endoscope was used to complete the elevation of the perichondrial flap as   well as the periosteal flap once reaching the bony cartilaginous junction on   the right side. The bony cartilaginous junction was then disjointed and the   periosteal flap on both sides was also elevated, going back towards to the sphenoid rostrum in an anterior-posterior direction and superior-inferior direction. The flap was elevated as far as possible superiorly, then inferiorly down to the nasal floor on the both sides. A septal knife was then used to cut the septal cartilage inferiorly along the   area of the maxillary crest to create a swinging door effect. The periosteum off the maxillary crest was then elevated. A closed Chan-Ratliff was used to reduce the size of the maxillary crest and some of the bone was removed. At the bony cartilaginous junction, the bone did not have a large spur to the bilaterally and so the ethmoid bone was then removed posteriorly behind the bony cartilaginous junction, thus reducing the deflection of the bone. Once the appropriate amount of bone was removed and the patient actually had a straight septum on just general nasal endoscopy bilaterally, a Boies elevator was used to lateralize both inferior turbinates. This gave a little bit more room. Sphenoid  LEFT:  Once the patient was properly set up, the pledgets were taken out of the nose and a 0-degree endoscope was placed in the patient's left nasal cavity.  Moving from a posterior to anterior position, the first sinus that was addressed was the sphenoid sinus.  The 0-degree endoscope was placed back to the area of the supreme turbinate along the posterior nasopharyngeal wall approximately 1 cm above the nasal choanae on the left side was seen the beginning of the sphenoid sinus. This was somewhat atretic and the sinus was maybe 1 to 2 mm at best. Using the Acclarent functional sphenoid guide, the guide was placed along the same path as the endoscope and placed right up to the area where the sphenoid sinus could be seen. The lighted guidewire was then placed into the sphenoid sinus with manipulation. Once it was in the sphenoid sinus, a 6-mm balloon was then allowed to pass over the area of the sphenoid sinus until the balloon straddled both sides of the sinus ostium. The balloon was then taken up to 12 mmHg pressure with water. This was allowed to sit for 3 seconds and was then taken down. The resulting ostium after balloon dilation of the sphenoid sinus was very large and allowed for visualization of the sphenoid sinus itself. This area was irrigated out with 180 ml of Saline. Then suction to remove residual irrigation. The endoscope and balloon were then removed    RIGHT:  The 0-degree endoscope was then placed in the patient's right nasal cavity. Moving from a posterior to anterior position, the first sinus that was addressed was the sphenoid sinus. The 0-degree endoscope was placed back to the area of the supreme turbinate along the posterior nasopharyngeal wall approximately 1 cm above the nasal choanae on the left side was seen the beginning of the sphenoid sinus. This was somewhat atretic and the sinus was maybe 1 to 2 mm at best. Using the Acclarent functional sphenoid guide, the guide was placed along the same path as the endoscope and placed right up to the area where the sphenoid sinus could be seen. The lighted guidewire was then placed into the sphenoid sinus with manipulation.  Once it was in the sphenoid sinus, a 6-mm balloon was then allowed to pass over the area of the sphenoid sinus until the balloon straddled both sides of the sinus ostium. The balloon was then taken up to 12 mmHg pressure with water. This was allowed to sit for 3 seconds and was then taken down. The resulting ostium after balloon dilation of the sphenoid sinus was very large and allowed for visualization of the sphenoid sinus itself. This area was irrigated out with 180 ml of Saline. Then suction to remove residual irrigation. The endoscope and balloon were then removed    Frontals  LEFT:  Attention was then turned to the frontals starting with the left side. Using the Dundy, the patient's middle turbinate was medialized until the ethmoid and uncinate process were in full view. Injections were placed at the root of the middle turbinate and approximately 1 to 2 mL were placed in the root of the middle turbinate for anesthesia as well as to control bleeding. The frontal sinus guide from [employer] was then used to find the patient's   frontal sinus. The guidewire was used and I was visually able to pass the guidewire into the patient's frontal sinus on the left side. This was clearly visible as the light was found just under the patient's brow in the area of the frontal sinus. A guidewire was left in place. The balloon was pushed over it until the balloon was straddling the frontal recess. The 6-mm balloon was then used to dilate, being dilated up to 12 mL of water pressure. This was held for 3 seconds and removed. There was a second more inferior   inflation to open up the frontal recess further. Once the frontal sinus was opened, it was visually examined and it was found to be large wide opening. The frontal recess was then irrigated out with 180 mL of normal saline to clear out the frontal sinus. The balloon was then retracted and the frontal sinus seeker was withdrawn.          Maxillary   LEFT:  With the maxillary sinuses, starting on the left side, the patient was found to have an accessory os. The patient was not also found to have a eliazar bullosa which was not reduced to allow access into the Osteomeatal complex. The maxillary sinus guidewire was used to go to the posterior aspect of the uncinate. The tip of the guidewire was placed just lateral to the uncinate process and the guidewire was advanced. Once the guidewire was placed across the ostium, visualization was found by light in the left maxillary sinus. The balloon was advanced over the ostium and on the inflation to 12 mm of pressure. The balloon did open up the uncinate process as well and the bowing of this uncinate process medially showed that we were in the appropriate position. This was allowed to hold for 3 seconds and then taken down. The patient's sinus was then irrigated out as well with 180 mL of normal saline and then the balloon and guidewire were withdrawn. The maxillary seeker was removed from the nose. RIGHT:  With the maxillary sinuses, starting on the right side, the patient was not found to have an accessory os. The patient was not also found to have a eliazar bullosa which was not reduced to allow access into the Osteomeatal complex. The maxillary sinus guidewire was used to go to the posterior aspect of the uncinate. The tip of the guidewire was placed just lateral to the uncinate process and the guidewire was advanced. Once the guidewire was placed across the ostium, visualization was found by light in the right maxillary sinus. The balloon was advanced over the ostium and on the inflation to 12 mm of pressure. The balloon did open up the uncinate process as well and the bowing of this uncinate process medially showed that we were in the appropriate position. This was allowed to hold for 3 seconds and then taken down. The patient's sinus was then irrigated out as well with 180 mL of normal saline and then the balloon and guidewire were withdrawn. The maxillary seeker was removed from the nose.      Anterior Ethmoidectomy:  The anterior ethmoids on the left were entered with a freer. The microdebrider and navigated suction the anterior ethmoid cells were removed to the basal lamella. This was done on the right as well. The bilateral inferior turbinates were medialized with a boies elevator. Using an arthrocare Turbinator wand, a small puncture hole was made with a #15 blade in the anterior portion of the leftinferior turbinate. The blade was advanced along the bone of the turbinate, elevating the periostium from the mucosa. The mucosa was then ablated until a thin mucosal layer was left over the bone. The turbinate was then lateralized with a boies elevator. Attention was turned to the right side. Using an arthrocare Turbinator wand, a small puncture hole was made with a #15 blade in the anterior portion of the leftinferior turbinate. The blade was advanced along the bone of the turbinate, elevating the periostium from the mucosa. The mucosa was then ablated until a thin mucosal layer was left over the bone. The turbinate was then lateralized with a boies elevator. Latera Implant (right)    Starting on the right nares, 0.5 cc of 1% lidocaine with epinephrine was injected in the intercartilaginous plane. The Latera marking device was placed along the external nasal sidewall. A vernon was placed above the nasal bones and along the ala. Using a 2 prong hook the ala was reflected and the Latera device was placed just posterior to the vestibular lining. The trajectory of the device was then medialized and the device was advanced along the ala and the nasal bones. Careful attention was paid to not puncture the skin or compromise the nasal lining mucosa. The tip of the device was palpated at the proximal vernon. The distal part of the device was stabilized and the implant was deployed. The implant was held in place as the device was slowly retracted.  Under gentle palpation the device was palpated and was in proper position. The implant did not compromise the nasal mucosal lining. Propel implant  LEFT   At the completion of these procedures, the patient's middle turbinate was medialized with a Propel Mini Middle turbinate implant to hold the middle turbinate open. RIGHT  At the completion of these procedures, the patient's middle turbinate was medialized with a Propel Mini Middle turbinate implant to hold the middle turbinate open. Abrams splints were then placed in the nose and sewn through septum. The middle turbinates were then packed with a Sinu-Foam gel. The patient was turned back to Anesthesia for appropriate awakening. Dr. Samuel Power was present the entire case.     Electronically signed by Rosemary Beltran DO on 1/12/2023 at 2:16 PM

## 2023-01-12 NOTE — ANESTHESIA PRE PROCEDURE
Department of Anesthesiology  Preprocedure Note       Name:  Nena Colvin   Age:  27 y.o.  :  1990                                          MRN:  [id number]         Date:  2023      Surgeon: Cheko Kowalski):  Mile Zeng DO    Procedure: Procedure(s):  FUNCTIONAL ENDOSCOPIC SINUS SURGERY  SEPTOPLASTY, SUBMUCOUS RESECTION INFERIOR NASAL TURBINATES  LEFT LATERA    Medications prior to admission:   Prior to Admission medications    Medication Sig Start Date End Date Taking? Authorizing Provider   HYDROcodone-acetaminophen (NORCO) 5-325 MG per tablet Take 1 tablet by mouth every 6 hours as needed for Pain for up to 7 days. Intended supply: 7 days. Take lowest dose possible to manage pain Max Daily Amount: 4 tablets 23 Yes Nataliya Briscoe,    ondansetron (ZOFRAN) 4 MG tablet Take 1 tablet by mouth 3 times daily as needed for Nausea or Vomiting 23  Yes Nataliya Briscoe DO       Current medications:    Current Facility-Administered Medications   Medication Dose Route Frequency Provider Last Rate Last Admin    lactated ringers infusion   IntraVENous Continuous Justine Millre  mL/hr at 23 1014 New Bag at 23 1014    sodium chloride flush 0.9 % injection 5-40 mL  5-40 mL IntraVENous 2 times per day Theoplis Luis Felipe, DO        sodium chloride flush 0.9 % injection 5-40 mL  5-40 mL IntraVENous PRN Theoplis Luis Felipe, DO        0.9 % sodium chloride infusion   IntraVENous PRN Theoplis Luis Felipe, DO           Allergies:     Allergies   Allergen Reactions    Penicillins Anaphylaxis       Problem List:    Patient Active Problem List   Diagnosis Code    Chronic right maxillary sinusitis J32.0    DNS (deviated nasal septum) J34.2    Nasal valve collapse J34.89    Hypertrophy of nasal turbinates J34.3       Past Medical History:        Diagnosis Date    Asthma     childhood    Kidney stones     Murmur     patient states this was as a child, he no longer has murmur Past Surgical History:        Procedure Laterality Date    DENTAL SURGERY      LITHOTRIPSY         Social History:    Social History     Tobacco Use    Smoking status: Never    Smokeless tobacco: Never   Substance Use Topics    Alcohol use: Yes     Comment: rare                                Counseling given: Not Answered      Vital Signs (Current):   Vitals:    01/05/23 1542 01/12/23 0959   BP:  130/67   Pulse:  60   Resp:  16   Temp:  97 °F (36.1 °C)   SpO2:  100%   Weight: 130 lb (59 kg) 128 lb (58.1 kg)   Height: 5' 7\" (1.702 m)                                               BP Readings from Last 3 Encounters:   01/12/23 130/67   06/03/22 127/76   04/22/22 132/77       NPO Status: Time of last liquid consumption: 2200                        Time of last solid consumption: 2220                        Date of last liquid consumption: 01/11/23                        Date of last solid food consumption: 01/11/23    BMI:   Wt Readings from Last 3 Encounters:   01/12/23 128 lb (58.1 kg)   06/03/22 128 lb (58.1 kg)   04/22/22 128 lb (58.1 kg)     Body mass index is 20.05 kg/m².     CBC:   Lab Results   Component Value Date/Time    WBC 7.4 04/18/2017 02:00 PM    RBC 4.86 04/18/2017 02:00 PM    HGB 15.6 04/18/2017 02:00 PM    HCT 43.9 04/18/2017 02:00 PM    MCV 90.3 04/18/2017 02:00 PM    RDW 13.0 04/18/2017 02:00 PM     04/18/2017 02:00 PM       CMP:   Lab Results   Component Value Date/Time     04/18/2017 02:00 PM    K 5.5 04/18/2017 02:00 PM     04/18/2017 02:00 PM    CO2 25 04/18/2017 02:00 PM    BUN 18 04/18/2017 02:00 PM    CREATININE 1.1 04/18/2017 02:00 PM    GFRAA >60 04/18/2017 02:00 PM    LABGLOM >60 04/18/2017 02:00 PM    GLUCOSE 86 04/18/2017 02:00 PM    PROT 7.1 04/18/2017 02:00 PM    CALCIUM 10.0 04/18/2017 02:00 PM    BILITOT 0.7 04/18/2017 02:00 PM    ALKPHOS 70 04/18/2017 02:00 PM    AST 23 04/18/2017 02:00 PM    ALT 13 04/18/2017 02:00 PM       POC Tests: No results for input(s): POCGLU, POCNA, POCK, POCCL, POCBUN, POCHEMO, POCHCT in the last 72 hours. Coags: No results found for: PROTIME, INR, APTT    HCG (If Applicable): No results found for: PREGTESTUR, PREGSERUM, HCG, HCGQUANT     ABGs: No results found for: PHART, PO2ART, PHB0AMT, FXE9AQL, BEART, [id number]     Type & Screen (If Applicable):  No results found for: LABABO, LABRH    Drug/Infectious Status (If Applicable):  No results found for: HIV, HEPCAB    COVID-19 Screening (If Applicable): No results found for: COVID19        Anesthesia Evaluation  Patient summary reviewed no history of anesthetic complications:   Airway: Mallampati: II  TM distance: >3 FB   Neck ROM: full  Mouth opening: > = 3 FB   Dental: normal exam         Pulmonary: breath sounds clear to auscultation  (+) asthma (CHILDHOOD):                            Cardiovascular:Negative CV ROS            Rhythm: regular             Beta Blocker:  Not on Beta Blocker         Neuro/Psych:   Negative Neuro/Psych ROS              GI/Hepatic/Renal: Neg GI/Hepatic/Renal ROS            Endo/Other: Negative Endo/Other ROS                    Abdominal:             Vascular: negative vascular ROS. Other Findings:           Anesthesia Plan      general     ASA 2       Induction: intravenous. MIPS: Postoperative opioids intended and Prophylactic antiemetics administered. Anesthetic plan and risks discussed with patient. Plan discussed with CRNA.               Jun Chu DO   1/12/2023

## 2023-01-12 NOTE — DISCHARGE INSTRUCTIONS
Follow up with Dr. Miguel Angel Mcguire in 7 days     Open Mouth and cover when sneezing, coughing  No nose blowing for 2 weeks  Nasal saline spray in each nostril 4-5 times a day  Take medicines as directed  Ice to back of neck for comfort  Sleep with head elevated 30 degrees for the next few days      Endoscopic Sinus Surgery: What to Expect at [address] will have a drip pad under your nose to collect mucus and blood. Change it only when it bleeds through. You may have to do this every hour for 24 hours after surgery. You may have some swelling of your nose, upper lip, cheeks, or around your eyes. Your nose may be sore and will bleed. You may feel \"stuffed up\" like you have a bad head cold. This will last for several days after surgery. The tip of your nose and your upper lip and gums may be numb. Feeling will return in a few weeks to a few months. Your sense of smell will not be as good after surgery. It will improve and probably return to normal in 1 to 2 months. You will probably be able to return to work or school in about 1 week and to your normal routine in about 3 weeks. However, this varies with your job and the extent of your surgery. Most people feel normal in 1 to 2 months. You will have to visit your doctor regularly for 3 to 4 months after your surgery. Your doctor will check to see that your sinuses are healing well. This care sheet gives you a general idea about how long it will take for you to recover. But each person recovers at a different pace. Follow the steps below to get better as quickly as possible. How can you care for yourself at home? Activity  Rest when you feel tired. Getting enough sleep will help you recover. Do not lie flat. Raise your head with three or four pillows. This can reduce swelling. Try to sleep on your back during the month after surgery. You can also sleep in a reclining chair. Try to walk each day. Start by walking a little more than you did the day before. Bit by bit, increase the amount you walk. Walking boosts blood flow and helps prevent pneumonia and constipation. Also, try to sit and stand as much as you can. For 1 week, try not to bend over or lift anything heavier than 10 pounds. This may include a child, heavy grocery bags and milk containers, a heavy briefcase or backpack, cat litter or dog food bags, or a vacuum . You can take a shower or bath. Use lukewarm, not hot water. Avoid swimming for 6 weeks. Avoid sawdust, chemicals, and excessive dust for 4 weeks. Avoid strenuous activities, such as biking, jogging, weight lifting, or aerobic exercise, for 1 week and then ease back into these activities over 2 to 3 weeks. You may drive when you are no longer taking prescription pain medicine and feel up to it. You will probably be able to return to work or school in about 1 week and to your normal routine in about 3 weeks. However, this varies with your job and the extent of your surgery. Diet  You can eat your normal diet. If your stomach is upset, try bland, low-fat foods like plain rice, broiled chicken, toast, and yogurt. You may notice that your bowel movements are not regular right after your surgery. This is common. Try to avoid constipation and straining with bowel movements. You may want to take a fiber supplement every day. If you have not had a bowel movement after a couple of days, ask your doctor about taking a mild laxative. Medicines  Do not take aspirin, aspirin-containing medicines, or anti-inflammatory medicines such as ibuprofen (Advil, Motrin) or naproxen (Aleve) for 3 weeks following surgery unless your doctor says it is okay. Take pain medicines exactly as directed. If the doctor gave you a prescription medicine for pain, take it as prescribed. Do not take two or more pain medicines at the same time unless the doctor told you to. Many pain medicines have acetaminophen, which is Tylenol.  Too much acetaminophen (Tylenol) can be harmful. If your doctor prescribed antibiotics, take them as directed. Do not stop taking them just because you feel better. You need to take the full course of antibiotics. If you think your pain medicine is making you sick to your stomach: Take your medicine after meals (unless your doctor has told you not to). Ask your doctor for a different pain medicine. Incision care  You probably will not have an incision (cut). You will have a drip pad under your nose to collect blood. Change it only when it has bled through. You may have to do this every hour for 24 hours after surgery. When bleeding stops, you can remove it. If you have packing in your nose, leave it in. Your doctor will take it out. Ice and elevation  To help with swelling and pain, put ice or a cold pack on your nose for 10 to 20 minutes at a time. Put a thin cloth between the ice and your skin. Do not sleep flat. Sleep with your head raised up. You can also sleep in a reclining chair. Other instructions  Do not blow your nose for 2 weeks. Do not put anything into your nose. If you must sneeze, open your mouth and sneeze naturally. Keep your mouth clean. Rinse your mouth with salt water or an alcohol-free mouthwash after each meal and before bedtime. After any packing is removed, use saline (saltwater) nasal washes to help keep your nasal passages open and wash out mucus and bacteria. You can buy saline nose drops at a grocery store or drugstore. You can wear your glasses when you wish. Do not wear contacts until the day after the surgery. Do not travel by airplane for at least 2 weeks. Your sinuses are still healing, and the changes in air pressure can affect them. Follow-up care is a key part of your treatment and safety. Be sure to make and go to all appointments, and call your doctor if you are having problems. It's also a good idea to know your test results and keep a list of the medicines you take.   When should you call for help?  Call 911 anytime you think you may need emergency care. For example, call if:  You passed out (lost consciousness). You have severe trouble breathing. Call your doctor now or seek immediate medical care if:  The dressing soaks through with blood and needs to be changed more than every 15 minutes. You have a fever with a stiff neck or a severe headache. You are sensitive to light, or you feel very sleepy or confused. You have pain that does not get better after you take pain medicine. You have a fever over 100°F.  You have double or blurred vision, cannot close your eyes, or have eye pain. Watch closely for changes in your health, and be sure to contact your doctor if:  You do not have a bowel movement after taking a laxative. Where can you learn more? Go to [email or web address] org and sign in to your [employer] account. Enter I900 in the [employer] box to learn more about Endoscopic Sinus Surgery: What to Expect at Home.     If you do not have an account, please click on the Sign Up Now link. © 6525-7702 Healthwise, Incorporated. Care instructions adapted under license by [facility]. This care instruction is for use with your licensed healthcare professional. If you have questions about a medical condition or this instruction, always ask your healthcare professional. [address] any warranty or liability for your use of this information. Content Version: 65.0.285088; Current as of: October 29, 2013             Nausea and Vomiting After Surgery: Care Instructions  Your Care Instructions     After you've had surgery, you may feel sick to your stomach (nauseated) or you may vomit. Sometimes anesthesia can make you feel sick. It's a common side effect and often doesn't last long. Pain also can make you feel sick or vomit. After the anesthesia wears off, you may feel pain from the incision (cut).  That pain could then upset your stomach. Taking pain medicine can also make you feel sick to your stomach. Whatever the cause, you may get medicine that can help. There are also some things you can do at home to prevent nausea and feel better. The doctor has checked you carefully, but problems can develop later. If you notice any problems or new symptoms, get medical treatment right away. Follow-up care is a key part of your treatment and safety. Be sure to make and go to all appointments, and call your doctor if you are having problems. It's also a good idea to know your test results and keep a list of the medicines you take. How can you care for yourself at home? Be safe with medicines. Read and follow all instructions on the label. If the doctor gave you a prescription medicine for pain, take it as prescribed. If you are not taking a prescription pain medicine, ask your doctor if you can take an over-the-counter medicine. Take your pain medicine as soon as you have pain. It works better if you take it before the pain gets bad. Call your doctor if you have any problems with your medicine. Rest in bed until you feel better. To prevent dehydration, drink plenty of fluids. Choose water and other clear liquids until you feel better. If you have kidney, heart, or liver disease and have to limit fluids, talk with your doctor before you increase the amount of fluids you drink. When you are able to eat, try clear soups, mild foods, and liquids until all symptoms are gone for 12 to 48 hours. Other good choices include dry toast, crackers, cooked cereal, and gelatin dessert, such as Jell-O. Do not smoke. Smoking and being around smoke can make nausea worse. If you need help quitting, talk to your doctor about stop-smoking programs and medicines. These can increase your chances of quitting for good. When should you call for help? Call 911  anytime you think you may need emergency care.  For example, call if:    You passed out (lost consciousness). Call your doctor now or seek immediate medical care if:    You have new or worse nausea or vomiting. You are too sick to your stomach to drink any fluids. You cannot keep down fluids. You have symptoms of dehydration, such as:  Dry eyes and a dry mouth. Passing only a little urine. Feeling thirstier than usual.     Your pain medicine is not helping. You are dizzy or lightheaded, or you feel like you may faint. Watch closely for changes in your health, and be sure to contact your doctor if:    You do not get better as expected. Current as of: March 9, 2022               Content Version: 13.5  © 4679-1173 Healthwise, Incorporated. Care instructions adapted under license by [facility] ([facility]). If you have questions about a medical condition or this instruction, always ask your healthcare professional. [address] any warranty or liability for your use of this information.

## 2023-01-12 NOTE — ANESTHESIA PRE PROCEDURE
Department of Anesthesiology  Preprocedure Note       Name:  Vane Galvan   Age:  56 [address] y.o.  :  1990                                          MRN:  [id number]         Date:  2023      Surgeon: Jackelyn Mack):  Mark Candelaria DO    Procedure: Procedure(s):  FUNCTIONAL ENDOSCOPIC SINUS SURGERY  SEPTOPLASTY, SUBMUCOUS RESECTION INFERIOR NASAL TURBINATES  LEFT LATERA    Medications prior to admission:   Prior to Admission medications    Medication Sig Start Date End Date Taking? Authorizing Provider   HYDROcodone-acetaminophen (NORCO) 5-325 MG per tablet Take 1 tablet by mouth every 6 hours as needed for Pain for up to 7 days. Intended supply: 7 days. Take lowest dose possible to manage pain Max Daily Amount: 4 tablets 23  Barbara Rom, DO   ondansetron (ZOFRAN) 4 MG tablet Take 1 tablet by mouth 3 times daily as needed for Nausea or Vomiting 23   Barbara Rom, DO       Current medications:    No current facility-administered medications for this visit. Current Outpatient Medications   Medication Sig Dispense Refill    HYDROcodone-acetaminophen (NORCO) 5-325 MG per tablet Take 1 tablet by mouth every 6 hours as needed for Pain for up to 7 days. Intended supply: 7 days.  Take lowest dose possible to manage pain Max Daily Amount: 4 tablets 28 tablet 0    ondansetron (ZOFRAN) 4 MG tablet Take 1 tablet by mouth 3 times daily as needed for Nausea or Vomiting 15 tablet 0     Facility-Administered Medications Ordered in Other Visits   Medication Dose Route Frequency Provider Last Rate Last Admin    lactated ringers infusion   IntraVENous Continuous Karin Mclaughlin  mL/hr at 23 1014 New Bag at 23 1014    sodium chloride flush 0.9 % injection 5-40 mL  5-40 mL IntraVENous 2 times per day Barbara Rom, DO        sodium chloride flush 0.9 % injection 5-40 mL  5-40 mL IntraVENous PRN Barbara Rom, DO        0.9 % sodium chloride infusion   IntraVENous PRN Chele Verma DO           Allergies: Allergies   Allergen Reactions    Penicillins Anaphylaxis       Problem List:    Patient Active Problem List   Diagnosis Code    Chronic right maxillary sinusitis J32.0    DNS (deviated nasal septum) J34.2    Nasal valve collapse J34.89    Hypertrophy of nasal turbinates J34.3       Past Medical History:        Diagnosis Date    Asthma     childhood    Kidney stones     Murmur     patient states this was as a child, he no longer has murmur       Past Surgical History:        Procedure Laterality Date    DENTAL SURGERY      LITHOTRIPSY         Social History:    Social History     Tobacco Use    Smoking status: Never    Smokeless tobacco: Never   Substance Use Topics    Alcohol use: Yes     Comment: rare                                Counseling given: Not Answered      Vital Signs (Current): There were no vitals filed for this visit.                                            BP Readings from Last 3 Encounters:   01/12/23 130/67   06/03/22 127/76   04/22/22 132/77       NPO Status:  >8 hours                                                                               BMI:   Wt Readings from Last 3 Encounters:   01/12/23 128 lb (58.1 kg)   06/03/22 128 lb (58.1 kg)   04/22/22 128 lb (58.1 kg)     There is no height or weight on file to calculate BMI.    CBC:   Lab Results   Component Value Date/Time    WBC 7.4 04/18/2017 02:00 PM    RBC 4.86 04/18/2017 02:00 PM    HGB 15.6 04/18/2017 02:00 PM    HCT 43.9 04/18/2017 02:00 PM    MCV 90.3 04/18/2017 02:00 PM    RDW 13.0 04/18/2017 02:00 PM     04/18/2017 02:00 PM       CMP:   Lab Results   Component Value Date/Time     04/18/2017 02:00 PM    K 5.5 04/18/2017 02:00 PM     04/18/2017 02:00 PM    CO2 25 04/18/2017 02:00 PM    BUN 18 04/18/2017 02:00 PM    CREATININE 1.1 04/18/2017 02:00 PM    GFRAA >60 04/18/2017 02:00 PM    LABGLOM >60 04/18/2017 02:00 PM    GLUCOSE 86 04/18/2017 02:00 PM PROT 7.1 04/18/2017 02:00 PM    CALCIUM 10.0 04/18/2017 02:00 PM    BILITOT 0.7 04/18/2017 02:00 PM    ALKPHOS 70 04/18/2017 02:00 PM    AST 23 04/18/2017 02:00 PM    ALT 13 04/18/2017 02:00 PM       POC Tests: No results for input(s): POCGLU, POCNA, POCK, POCCL, POCBUN, POCHEMO, POCHCT in the last 72 hours. Coags: No results found for: PROTIME, INR, APTT    HCG (If Applicable): No results found for: PREGTESTUR, PREGSERUM, HCG, HCGQUANT     ABGs: No results found for: PHART, PO2ART, KOS5ZAK, VPD9WAI, BEART, B2SWSGQG     Type & Screen (If Applicable):  No results found for: LABABO, LABRH    Drug/Infectious Status (If Applicable):  No results found for: HIV, HEPCAB    COVID-19 Screening (If Applicable): No results found for: COVID19        Anesthesia Evaluation  Patient summary reviewed and Nursing notes reviewed no history of anesthetic complications:   Airway: Mallampati: II  TM distance: >3 FB   Neck ROM: full  Mouth opening: > = 3 FB   Dental: normal exam         Pulmonary: breath sounds clear to auscultation  (+) asthma (childhood):                            Cardiovascular:    (+) valvular problems/murmurs:,         Rhythm: regular             Beta Blocker:  Not on Beta Blocker         Neuro/Psych:   Negative Neuro/Psych ROS              GI/Hepatic/Renal:   (+) renal disease: kidney stones,           Endo/Other: Negative Endo/Other ROS                    Abdominal:             Vascular: negative vascular ROS. Other Findings:                     Anesthesia Plan      general     ASA 2       Induction: intravenous. MIPS: Postoperative opioids intended and Prophylactic antiemetics administered. Anesthetic plan and risks discussed with patient. Plan discussed with CRNA. DOS STAFF ADDENDUM:    Pt seen and examined, physical exam updated, chart reviewed including anesthesia, drug and allergy history. H&P reviewed.   No interval changes to history or physical examination (unless noted above). NPO status confirmed. Anesthetic plan, risks, benefits, alternatives discussed with patient. Patient verbalized an understanding and agrees to proceed.      Burgess Raphael DO  Staff Anesthesiologist  11:03 AM

## 2023-01-12 NOTE — H&P
[address] [facility] Otolaryngology  Dr. Elena Sinclair. Candace Beyer. Ms.Ed           Patient Name:  Jose Martinez  :  1990      CHIEF C/O:         Chief Complaint   Patient presents with    Follow-up       6 wk sinus CT,          HISTORY OBTAINED FROM:  patient     HISTORY OF PRESENT ILLNESS:       Juan Carlos Whitt is a 27y.o. year old male, here today for follow up of sinus congestion and CT sinus results. Patient was last seen 6 weeks ago and placed on Flonase. He states since starting the medication he did notice mild improvement in his congestion symptoms with decreased sinus pressure. CT of the sinuses do show right maxillary sinusitis with leftward nasal septal deviation. Patient denies any recent fevers or recent antibiotics. He denies any significant rhinorrhea or postnasal drainage at this time. He continues to have periods of significant congestion through the left nostril throughout the day. Patient is interested in surgical intervention if it is an option. Past Medical History        Past Medical History:   Diagnosis Date    Asthma       childhood    Kidney stones      Murmur       patient states this was as a child, he no longer has murmur         Past Surgical History   History reviewed. No pertinent surgical history. Current Medication      Current Outpatient Medications:     azelastine (ASTELIN) 0.1 % nasal spray, 1-2 sprays by Nasal route 2 times daily as needed for Rhinitis Use in each nostril as directed, Disp: 30 mL, Rfl: 1    fluticasone (FLONASE) 50 MCG/ACT nasal spray, 2 sprays by Each Nostril route daily, Disp: 16 g, Rfl: 1    vitamin D (CHOLECALCIFEROL) 1000 UNIT TABS tablet, Take 1,000 Units by mouth daily, Disp: , Rfl:      Penicillins  Social History            Tobacco Use    Smoking status: Never Smoker    Smokeless tobacco: Never Used   Substance Use Topics    Alcohol use: Yes       Comment: soc.     Drug use: No      Family History         Family History   Problem Relation Age of Onset    No Known Problems Mother              Review of Systems   Constitutional: Negative. Negative for activity change and appetite change. HENT: Positive for congestion, postnasal drip, rhinorrhea and sinus pressure. Eyes: Negative. Respiratory: Negative. Negative for shortness of breath and stridor. Cardiovascular: Negative. Negative for chest pain and palpitations. Endocrine: Negative. Musculoskeletal: Negative. Skin: Negative. Neurological: Negative. Negative for dizziness. Hematological: Negative. Psychiatric/Behavioral: Negative. /76   Pulse 51   Ht 5' 7\" (1.702 m)   Wt 128 lb (58.1 kg)   BMI 20.05 kg/m²   Physical Exam  Constitutional:       Appearance: Normal appearance. HENT:      Head: Normocephalic. Right Ear: Tympanic membrane, ear canal and external ear normal.      Left Ear: Tympanic membrane, ear canal and external ear normal.      Nose: Septal deviation present. Right Turbinates: Pale. Left Turbinates: Pale. Mouth/Throat:      Lips: Pink. Mouth: Mucous membranes are moist.     Eyes:      Conjunctiva/sclera: Conjunctivae normal.      Pupils: Pupils are equal, round, and reactive to light. Cardiovascular:      Rate and Rhythm: Normal rate and regular rhythm. Pulses: Normal pulses. Pulmonary:      Effort: Pulmonary effort is normal. No respiratory distress. Breath sounds: No stridor. Musculoskeletal:         General: Normal range of motion. Cervical back: Normal range of motion. No rigidity. No muscular tenderness. Skin:     General: Skin is warm and dry. Neurological:      General: No focal deficit present. Mental Status: He is alert and oriented to person, place, and time. Psychiatric:         Mood and Affect: Mood normal.         Behavior: Behavior normal.         Thought Content: Thought content normal.         Judgment: Judgment normal.      CT Sinus:      Impression   1.  Right maxillary sinusitis. 2. Mild leftward deviation of the nasal septum. IMPRESSION/PLAN:     Demond Cruz was seen today for follow-up. Diagnoses and all orders for this visit:     DNS (deviated nasal septum)     Chronic right maxillary sinusitis     Nasal valve collapse     Allergic rhinitis, unspecified seasonality, unspecified trigger     Other orders  -     azelastine (ASTELIN) 0.1 % nasal spray; 1-2 sprays by Nasal route 2 times daily as needed for Rhinitis Use in each nostril as directed     Patient will continue with Flonase, 2 sprays each nostril once daily and be given Astelin spray, 1 to 2 sprays each nostril twice daily as needed for continuing congestion symptoms. CT of the sinuses is reviewed with the patient showing chronic right maxillary sinusitis with leftward nasal septal deviation. Patient continues to have symptoms of the left nasal valve collapse. At this time it is recommended that the patient may benefit from functional endoscopic sinus surgery with nasal septoplasty and submucosal resection of the inferior turbinates. Patient may also benefit from possible left batten graft versus left Latera. Risks and benefits of the procedure are discussed with the patient who wishes to proceed. He will be scheduled with Dr. Debo Crow at [facility] outpatient surgery center for his procedure with follow-up 1 week after his procedure. He is instructed to call with any new or worsening of symptoms prior to this time.         Cameron Gomes, MSN, FNP-C  8 [facility], Nose and Throat     The information contained in this note has been dictated using drug and medical speech recognition software and may contain errors

## 2023-01-12 NOTE — H&P
Cintia Pelayo was seen and re-examined preoperatively today, January 12, 2023. There was no substantial change in his physical and medical status. Patient is fit for the proposed surgical procedure. All questions were appropriately addressed and had no further questions regarding the risks, benefits, and alternatives of the procedure. Cintia Pelayo and family wished to proceed.     Ferny Mcnair DO  Resident Physician  [facility])  Otolaryngology Residency  1/12/2023  10:13 AM

## 2023-01-12 NOTE — ANESTHESIA POSTPROCEDURE EVALUATION
Department of Anesthesiology  Postprocedure Note    Patient: Ji Sotelo  MRN: [id number]  YOB: 1990  Date of evaluation: 1/12/2023      Procedure Summary     Date: 01/12/23 Room / Location: [address] [place] [address] 03 / 4199 [facility]    Anesthesia Start: 1233 Anesthesia Stop: 5242    Procedures:       FUNCTIONAL ENDOSCOPIC SINUS SURGERY (Bilateral)      SEPTOPLASTY, SUBMUCOUS RESECTION INFERIOR NASAL TURBINATES      LEFT LATERA (Left) Diagnosis:       Chronic right maxillary sinusitis      DNS (deviated nasal septum)      Nasal valve collapse      Hypertrophy of nasal turbinates      (Chronic right maxillary sinusitis [J32.0])      (DNS (deviated nasal septum) [J34.2])      (Nasal valve collapse [J34.89])      (Hypertrophy of nasal turbinates [J34.3])    Surgeons: Boom Hurtado DO Responsible Provider: Alec Sandoval DO    Anesthesia Type: General ASA Status: 2          Anesthesia Type: General    Carrie Phase I: Carrie Score: 10    Carrie Phase II: Carrie Score: 10      Anesthesia Post Evaluation    Patient location during evaluation: PACU  Patient participation: complete - patient participated  Level of consciousness: awake and alert  Airway patency: patent  Nausea & Vomiting: no nausea and no vomiting  Complications: no  Cardiovascular status: hemodynamically stable  Respiratory status: acceptable  Hydration status: euvolemic

## 2023-01-20 ENCOUNTER — OFFICE VISIT (OUTPATIENT)
Dept: ENT CLINIC | Age: 33
End: 2023-01-20

## 2023-01-20 VITALS — BODY MASS INDEX: 20.09 KG/M2 | HEIGHT: 67 IN | WEIGHT: 128 LBS

## 2023-01-20 DIAGNOSIS — J32.0 CHRONIC RIGHT MAXILLARY SINUSITIS: ICD-10-CM

## 2023-01-20 DIAGNOSIS — R09.81 NASAL CONGESTION: ICD-10-CM

## 2023-01-20 DIAGNOSIS — J34.89 NASAL VALVE COLLAPSE: ICD-10-CM

## 2023-01-20 DIAGNOSIS — J34.2 DNS (DEVIATED NASAL SEPTUM): Primary | ICD-10-CM

## 2023-01-20 DIAGNOSIS — J30.9 ALLERGIC RHINITIS, UNSPECIFIED SEASONALITY, UNSPECIFIED TRIGGER: ICD-10-CM

## 2023-01-20 PROCEDURE — 99024 POSTOP FOLLOW-UP VISIT: CPT | Performed by: OTOLARYNGOLOGY

## 2023-01-20 RX ORDER — IBUPROFEN, ACETAMINOPHEN 125; 250 MG/1; MG/1
TABLET, FILM COATED ORAL
COMMUNITY

## 2023-01-20 NOTE — PROGRESS NOTES
Subjective:      Patient ID: Niko Correia is a 27 y.o. male. HPI Comments: Pt returns for recheck Septo/SMRIT Pt has difficulty breathing through nose. Pt is not having problems since surgery. Doing well. Other  Associated symptoms include congestion. Review of Systems   HENT: Positive for congestion and postnasal drip. All other systems reviewed and are negative. Objective:   Physical Exam   Constitutional: He is oriented to person, place, and time. He appears well-developed and well-nourished. HENT:   Head: Normocephalic and atraumatic. Right Ear: Hearing, tympanic membrane, external ear and ear canal normal.   Left Ear: Hearing, tympanic membrane, external ear and ear canal normal.   Mouth/Throat: Uvula is midline, oropharynx is clear and moist and mucous membranes are normal.   Septal splints in place - removed with no difficulty. Eyes: Conjunctivae and EOM are normal. Pupils are equal, round, and reactive to light. Neck: Normal range of motion. Neck supple. Cardiovascular: Normal rate, regular rhythm and normal heart sounds. Pulmonary/Chest: Effort normal and breath sounds normal.   Abdominal: Soft. Bowel sounds are normal.   Neurological: He is alert and oriented to person, place, and time. Vitals reviewed. Assessment:       Diagnosis Orders   1. DNS (deviated nasal septum)        2. Chronic right maxillary sinusitis        3. Nasal valve collapse        4. Allergic rhinitis, unspecified seasonality, unspecified trigger        5. Nasal congestion                   Plan:     SP FESS, Septo, SMRIT, R Latera  Follow up in 6 week(s)  Continue saline spray daily. Call or return to clinic prn if these symptoms worsen or fail to improve as anticipated.

## 2023-03-10 ENCOUNTER — OFFICE VISIT (OUTPATIENT)
Dept: ENT CLINIC | Age: 33
End: 2023-03-10

## 2023-03-10 VITALS
HEART RATE: 59 BPM | SYSTOLIC BLOOD PRESSURE: 135 MMHG | WEIGHT: 132 LBS | DIASTOLIC BLOOD PRESSURE: 78 MMHG | BODY MASS INDEX: 20.72 KG/M2 | HEIGHT: 67 IN

## 2023-03-10 DIAGNOSIS — J30.9 ALLERGIC RHINITIS, UNSPECIFIED SEASONALITY, UNSPECIFIED TRIGGER: ICD-10-CM

## 2023-03-10 DIAGNOSIS — J32.0 CHRONIC RIGHT MAXILLARY SINUSITIS: ICD-10-CM

## 2023-03-10 DIAGNOSIS — J34.2 DNS (DEVIATED NASAL SEPTUM): Primary | ICD-10-CM

## 2023-03-10 DIAGNOSIS — R09.81 NASAL CONGESTION: ICD-10-CM

## 2023-03-10 DIAGNOSIS — J34.89 NASAL VALVE COLLAPSE: ICD-10-CM

## 2023-03-10 PROCEDURE — 99024 POSTOP FOLLOW-UP VISIT: CPT | Performed by: OTOLARYNGOLOGY

## 2023-03-10 RX ORDER — LEVOFLOXACIN 750 MG/1
750 TABLET ORAL DAILY
Qty: 10 TABLET | Refills: 0 | Status: SHIPPED | OUTPATIENT
Start: 2023-03-10 | End: 2023-03-20

## 2023-03-10 ASSESSMENT — ENCOUNTER SYMPTOMS
RHINORRHEA: 0
TROUBLE SWALLOWING: 0
COUGH: 0
VOICE CHANGE: 0
SHORTNESS OF BREATH: 0
VOMITING: 0
SORE THROAT: 0
SINUS PAIN: 1
SINUS PRESSURE: 1

## 2023-03-10 NOTE — PROGRESS NOTES
[facility] Otolaryngology  Dr. Elliott Severe. [facility], [address] [facility] Follow Up        Patient Name:  Sean Gomes  :  1990     CHIEF C/O:    Chief Complaint   Patient presents with    Post-Op Check     6 wk p/o FESS SEPTO and SMRIT  patient states doing well up until about 3 weeks ago believes he has a sinus infection       HISTORY OBTAINED FROM:  patient    HISTORY OF PRESENT ILLNESS:       Haley Downey is a 26y.o. year old male, here today for follow up of s/p 6 weeks fess septo smrit; was doing well until about 3 weeks ago started symptoms of sinus infeciton    Review of Systems   Constitutional:  Negative for chills and fever. HENT:  Positive for congestion, sinus pressure and sinus pain. Negative for ear discharge, hearing loss, rhinorrhea, sore throat, trouble swallowing and voice change. Respiratory:  Negative for cough and shortness of breath. Cardiovascular:  Negative for chest pain and palpitations. Gastrointestinal:  Negative for vomiting. Skin:  Negative for rash. Allergic/Immunologic: Negative for environmental allergies. Neurological:  Negative for dizziness and headaches. Hematological:  Does not bruise/bleed easily. All other systems reviewed and are negative. /78   Pulse 59   Ht 5' 7\" (1.702 m)   Wt 132 lb (59.9 kg)   BMI 20.67 kg/m²   Physical Exam  Vitals and nursing note reviewed. Constitutional:       Appearance: He is well-developed. HENT:      Head: Normocephalic and atraumatic. No contusion, masses or laceration. Right Ear: Tympanic membrane and ear canal normal. There is no impacted cerumen. Left Ear: Tympanic membrane and ear canal normal. There is no impacted cerumen. Nose: Congestion present. No rhinorrhea. Right Nostril: No epistaxis. Left Nostril: No epistaxis. Right Turbinates: Not swollen. Left Turbinates: Not swollen.       Comments: Right OMC inflammatory changes with joel pus     Mouth/Throat:      Mouth: Mucous membranes are moist.      Pharynx: No oropharyngeal exudate or posterior oropharyngeal erythema. Eyes:      Pupils: Pupils are equal, round, and reactive to light. Neck:      Thyroid: No thyromegaly. Trachea: No tracheal deviation. Cardiovascular:      Rate and Rhythm: Normal rate. Pulmonary:      Effort: Pulmonary effort is normal. No respiratory distress. Musculoskeletal:         General: Normal range of motion. Cervical back: Normal range of motion. Lymphadenopathy:      Cervical: No cervical adenopathy. Skin:     General: Skin is warm. Findings: No erythema. Neurological:      Mental Status: He is alert. Cranial Nerves: No cranial nerve deficit. IMPRESSION/PLAN:  Right OMC inflammatory changes with joel pus  Levaquin qd x 10 days  Follow up 2 wks    Dr. Valeria Nichole Otolaryngology/Facial Plastic Surgery Residency  Associate Clinical Professor:  Maida Parish, [facility]

## 2023-03-27 ENCOUNTER — OFFICE VISIT (OUTPATIENT)
Dept: ENT CLINIC | Age: 33
End: 2023-03-27

## 2023-03-27 VITALS — BODY MASS INDEX: 20.72 KG/M2 | WEIGHT: 132 LBS | HEIGHT: 67 IN

## 2023-03-27 DIAGNOSIS — M95.0 NASAL VALVE COLLAPSE: ICD-10-CM

## 2023-03-27 DIAGNOSIS — R09.81 NASAL CONGESTION: Primary | ICD-10-CM

## 2023-03-27 DIAGNOSIS — J34.2 DNS (DEVIATED NASAL SEPTUM): ICD-10-CM

## 2023-03-27 PROCEDURE — 99024 POSTOP FOLLOW-UP VISIT: CPT | Performed by: OTOLARYNGOLOGY

## 2023-03-27 RX ORDER — AZELASTINE 1 MG/ML
2 SPRAY, METERED NASAL 2 TIMES DAILY
Qty: 30 ML | Refills: 1 | Status: SHIPPED | OUTPATIENT
Start: 2023-03-27

## 2023-03-27 NOTE — PROGRESS NOTES
Exam  Vitals and nursing note reviewed. Constitutional:       Appearance: He is well-developed. HENT:      Head: Normocephalic and atraumatic. Right Ear: Tympanic membrane and ear canal normal.      Left Ear: Tympanic membrane and ear canal normal.      Nose: No congestion or rhinorrhea. Eyes:      Pupils: Pupils are equal, round, and reactive to light. Neck:      Thyroid: No thyromegaly. Trachea: No tracheal deviation. Cardiovascular:      Rate and Rhythm: Normal rate. Pulmonary:      Effort: Pulmonary effort is normal. No respiratory distress. Musculoskeletal:         General: Normal range of motion. Cervical back: Normal range of motion. Lymphadenopathy:      Cervical: No cervical adenopathy. Skin:     General: Skin is warm. Findings: No erythema. Neurological:      Mental Status: He is alert. Cranial Nerves: No cranial nerve deficit. IMPRESSION/PLAN:  Patient seen and examined, recommendation for the patient to continue current medical therapy, start Astelin daily for chronic rhinitis significant improvement ostiomeatal complex are now widely patent without erythematous changes or noted mucopurulence. Dr. Luis Olivo.  Otolaryngology Facial Plastic Surgery  :  [facility] Otolaryngology/Facial Plastic Surgery Residency  Associate Clinical Professor:  Verito Woods [facility]

## 2023-05-12 ENCOUNTER — OFFICE VISIT (OUTPATIENT)
Dept: ENT CLINIC | Age: 33
End: 2023-05-12
Payer: COMMERCIAL

## 2023-05-12 VITALS
SYSTOLIC BLOOD PRESSURE: 138 MMHG | BODY MASS INDEX: 20.53 KG/M2 | WEIGHT: 130.8 LBS | HEART RATE: 76 BPM | DIASTOLIC BLOOD PRESSURE: 68 MMHG | HEIGHT: 67 IN

## 2023-05-12 DIAGNOSIS — R09.81 NASAL CONGESTION: Primary | ICD-10-CM

## 2023-05-12 DIAGNOSIS — J30.9 ALLERGIC RHINITIS, UNSPECIFIED SEASONALITY, UNSPECIFIED TRIGGER: ICD-10-CM

## 2023-05-12 DIAGNOSIS — J34.2 DNS (DEVIATED NASAL SEPTUM): ICD-10-CM

## 2023-05-12 PROCEDURE — 99203 OFFICE O/P NEW LOW 30 MIN: CPT | Performed by: OTOLARYNGOLOGY

## 2023-05-12 ASSESSMENT — ENCOUNTER SYMPTOMS
RHINORRHEA: 0
VOICE CHANGE: 0
SHORTNESS OF BREATH: 0
SORE THROAT: 0
COUGH: 0
VOMITING: 0
TROUBLE SWALLOWING: 0

## 2023-08-04 ENCOUNTER — OFFICE VISIT (OUTPATIENT)
Dept: ENT CLINIC | Age: 33
End: 2023-08-04
Payer: COMMERCIAL

## 2023-08-04 VITALS — HEIGHT: 67 IN | WEIGHT: 130 LBS | BODY MASS INDEX: 20.4 KG/M2

## 2023-08-04 DIAGNOSIS — H60.391 OTHER INFECTIVE ACUTE OTITIS EXTERNA OF RIGHT EAR: Primary | ICD-10-CM

## 2023-08-04 DIAGNOSIS — H60.391 OTHER INFECTIVE ACUTE OTITIS EXTERNA OF RIGHT EAR: ICD-10-CM

## 2023-08-04 PROCEDURE — 99213 OFFICE O/P EST LOW 20 MIN: CPT | Performed by: NURSE PRACTITIONER

## 2023-08-04 RX ORDER — CIPROFLOXACIN AND DEXAMETHASONE 3; 1 MG/ML; MG/ML
4 SUSPENSION/ DROPS AURICULAR (OTIC) 2 TIMES DAILY
Qty: 7.5 ML | Refills: 3 | Status: SHIPPED | OUTPATIENT
Start: 2023-08-04 | End: 2023-08-11

## 2023-08-04 ASSESSMENT — ENCOUNTER SYMPTOMS
SHORTNESS OF BREATH: 0
RESPIRATORY NEGATIVE: 1
EYES NEGATIVE: 1
STRIDOR: 0

## 2023-08-06 LAB
CULTURE: ABNORMAL
DIRECT EXAM: ABNORMAL
SPECIMEN DESCRIPTION: ABNORMAL

## 2023-08-11 ENCOUNTER — PROCEDURE VISIT (OUTPATIENT)
Dept: AUDIOLOGY | Age: 33
End: 2023-08-11

## 2023-08-11 ENCOUNTER — OFFICE VISIT (OUTPATIENT)
Dept: ENT CLINIC | Age: 33
End: 2023-08-11
Payer: COMMERCIAL

## 2023-08-11 VITALS — WEIGHT: 130 LBS | HEIGHT: 67 IN | BODY MASS INDEX: 20.4 KG/M2

## 2023-08-11 DIAGNOSIS — H60.501 ACUTE OTITIS EXTERNA OF RIGHT EAR, UNSPECIFIED TYPE: Primary | ICD-10-CM

## 2023-08-11 DIAGNOSIS — H60.391 OTHER INFECTIVE ACUTE OTITIS EXTERNA OF RIGHT EAR: Primary | ICD-10-CM

## 2023-08-11 DIAGNOSIS — H93.8X1 SENSATION OF FULLNESS IN RIGHT EAR: ICD-10-CM

## 2023-08-11 PROCEDURE — 99213 OFFICE O/P EST LOW 20 MIN: CPT | Performed by: NURSE PRACTITIONER

## 2023-08-11 RX ORDER — NEOMYCIN SULFATE, POLYMYXIN B SULFATE AND HYDROCORTISONE 10; 3.5; 1 MG/ML; MG/ML; [USP'U]/ML
SUSPENSION/ DROPS AURICULAR (OTIC)
COMMUNITY
Start: 2023-08-03

## 2023-08-11 ASSESSMENT — ENCOUNTER SYMPTOMS
RHINORRHEA: 0
RESPIRATORY NEGATIVE: 1
EYES NEGATIVE: 1
STRIDOR: 0
SINUS PRESSURE: 1
SHORTNESS OF BREATH: 0
SINUS PAIN: 0

## 2023-08-11 NOTE — PROGRESS NOTES
[address] [facility] Otolaryngology  Dr. Royal Patton Dad. Ms.Ed        Patient Name:  Saurabh Schneider  :  1990     CHIEF C/O:    Chief Complaint   Patient presents with    Follow-up     1 wk culture and wick,        HISTORY OBTAINED FROM:  patient    HISTORY OF PRESENT ILLNESS:       Natalia Avendano is a 26y.o. year old male, here today for follow up of:       Right otitis externa. Patient was last seen 1 week ago and had a wick placed in the right ear and culture obtained of the drainage. At that time he was started on Ciprodex drops. Patient states culture stayed in place for approximately 2 to 3 days prior to falling out. He does note decreased pain and pressure in the ear with decreased drainage. He does continue to have some muffled hearing especially in the morning which does seem to improve throughout the day. Your culture returned back positive for Pseudomonas. Patient was also recently started on doxycycline for a sinus infection. He does note some improvement since starting the medication. He has no new complaints at this time.          Past Medical History:   Diagnosis Date    Asthma     childhood    Kidney stones     Murmur     patient states this was as a child, he no longer has murmur     Past Surgical History:   Procedure Laterality Date    DENTAL SURGERY      LITHOTRIPSY      NOSE SURGERY Left 2023    LEFT LATERA performed by Power Staley DO at [address] [place] [address] N/A 2023    SEPTOPLASTY, SUBMUCOUS RESECTION INFERIOR NASAL TURBINATES performed by Power Staley DO at [facility] Bilateral 2023    FUNCTIONAL ENDOSCOPIC SINUS SURGERY performed by Power Staley DO at [address] [place] [address]       Current Outpatient Medications:     neomycin-polymyxin-hydrocortisone (CORTISPORIN) 3.5-80661-7 otic suspension, INSTILL 4 DROPS INTO AFFECTED EAR(S) THREE TIMES DAILY FOR 10 DAYS, Disp: , Rfl:     doxycycline hyclate (VIBRA-TABS) 100 MG tablet, Take 1

## 2023-08-11 NOTE — PROGRESS NOTES
This patient was referred for tympanometric testing by VERO Ferrer due to ear fullness and otitis externa, right ear. Tympanometry revealed normal middle ear peak pressure and compliance, bilaterally. Ipsilateral acoustic reflexes were present, bilaterally at 1000Hz. The results were reviewed with the patient. Recommendations for follow up will be made pending physician consult.     Bernadette Malik CCC/GARETT  Audiologist  [id number]  NPI#:  [id number]      Electronically signed by Reji Gaytan on 8/11/2023 at 11:22 AM

## 2023-09-01 ENCOUNTER — OFFICE VISIT (OUTPATIENT)
Dept: ENT CLINIC | Age: 33
End: 2023-09-01
Payer: COMMERCIAL

## 2023-09-01 VITALS
HEART RATE: 50 BPM | SYSTOLIC BLOOD PRESSURE: 131 MMHG | WEIGHT: 134.8 LBS | BODY MASS INDEX: 21.16 KG/M2 | DIASTOLIC BLOOD PRESSURE: 71 MMHG | HEIGHT: 67 IN

## 2023-09-01 DIAGNOSIS — J30.9 ALLERGIC RHINITIS, UNSPECIFIED SEASONALITY, UNSPECIFIED TRIGGER: ICD-10-CM

## 2023-09-01 DIAGNOSIS — J34.2 DNS (DEVIATED NASAL SEPTUM): Primary | ICD-10-CM

## 2023-09-01 DIAGNOSIS — J32.0 CHRONIC RIGHT MAXILLARY SINUSITIS: ICD-10-CM

## 2023-09-01 DIAGNOSIS — R09.81 NASAL CONGESTION: ICD-10-CM

## 2023-09-01 PROCEDURE — 99213 OFFICE O/P EST LOW 20 MIN: CPT | Performed by: OTOLARYNGOLOGY

## 2023-09-01 RX ORDER — AZELASTINE 1 MG/ML
2 SPRAY, METERED NASAL 2 TIMES DAILY
Qty: 30 ML | Refills: 1 | Status: SHIPPED | OUTPATIENT
Start: 2023-09-01

## 2023-09-01 RX ORDER — FLUTICASONE PROPIONATE 50 MCG
2 SPRAY, SUSPENSION (ML) NASAL DAILY
Qty: 1 EACH | Refills: 3 | Status: SHIPPED | OUTPATIENT
Start: 2023-09-01

## 2023-09-01 ASSESSMENT — ENCOUNTER SYMPTOMS
SINUS PAIN: 1
SHORTNESS OF BREATH: 0
RHINORRHEA: 1
COUGH: 0
SINUS PRESSURE: 1
VOMITING: 0

## 2023-09-01 NOTE — PROGRESS NOTES
[facility]  Dr. Bard Marilee Calzada. Ms.Ed        Patient Name:  Kalie Ruffin  :  1990     CHIEF C/O:    Chief Complaint   Patient presents with    Nasal Congestion     Pt states he gets mucus that comes out of nose, states its been going on since the surgery        HISTORY OBTAINED FROM:  patient    HISTORY OF PRESENT ILLNESS:       Cony Atkinson is a 26y.o. year old male, here today for follow up of:       Patient seen and examined in follow-up for known history of right-sided otorrhea and external otitis, topical and oral antibiotics and has improved significantly. Patient has a known history of previous sinus surgery as well as septal surgery, he is having some increasing rhinitis and nasal congestion he has been utilizing nasal spray, but on a consistent basis. No complaints epistaxis or for throat fever chills nausea vomiting chest pain. No complaints of headaches vision changes no complaints from spinning vertigo.          Past Medical History:   Diagnosis Date    Asthma     childhood    Kidney stones     Murmur     patient states this was as a child, he no longer has murmur     Past Surgical History:   Procedure Laterality Date    DENTAL SURGERY      LITHOTRIPSY      NOSE SURGERY Left 2023    LEFT LATERA performed by Elli Boateng DO at [address] [place] [address]    SEPTOPLASTY N/A 2023    SEPTOPLASTY, SUBMUCOUS RESECTION INFERIOR NASAL TURBINATES performed by Elli Boateng DO at [facility] Bilateral 2023    FUNCTIONAL ENDOSCOPIC SINUS SURGERY performed by Elli Boateng DO at [address] [place] [address]       Current Outpatient Medications:     neomycin-polymyxin-hydrocortisone (CORTISPORIN) 3.5-03928-0 otic suspension, INSTILL 4 DROPS INTO AFFECTED EAR(S) THREE TIMES DAILY FOR 10 DAYS (Patient not taking: Reported on 2023), Disp: , Rfl:     azelastine (ASTELIN) 0.1 % nasal spray, 2 sprays by Nasal route 2 times daily Use in each nostril as directed

## 2023-10-13 ENCOUNTER — OFFICE VISIT (OUTPATIENT)
Dept: ENT CLINIC | Age: 33
End: 2023-10-13
Payer: COMMERCIAL

## 2023-10-13 VITALS
DIASTOLIC BLOOD PRESSURE: 71 MMHG | HEIGHT: 67 IN | BODY MASS INDEX: 21.35 KG/M2 | SYSTOLIC BLOOD PRESSURE: 126 MMHG | HEART RATE: 60 BPM | WEIGHT: 136 LBS

## 2023-10-13 DIAGNOSIS — J01.41 ACUTE RECURRENT PANSINUSITIS: ICD-10-CM

## 2023-10-13 DIAGNOSIS — J30.9 ALLERGIC RHINITIS, UNSPECIFIED SEASONALITY, UNSPECIFIED TRIGGER: Primary | ICD-10-CM

## 2023-10-13 DIAGNOSIS — J32.0 CHRONIC RIGHT MAXILLARY SINUSITIS: ICD-10-CM

## 2023-10-13 PROCEDURE — 99214 OFFICE O/P EST MOD 30 MIN: CPT | Performed by: OTOLARYNGOLOGY

## 2023-10-13 RX ORDER — PREDNISONE 20 MG/1
20 TABLET ORAL DAILY
Qty: 7 TABLET | Refills: 0 | Status: SHIPPED | OUTPATIENT
Start: 2023-10-13 | End: 2023-10-20

## 2023-10-13 RX ORDER — DOXYCYCLINE HYCLATE 100 MG
100 TABLET ORAL 2 TIMES DAILY
Qty: 14 TABLET | Refills: 0 | Status: SHIPPED | OUTPATIENT
Start: 2023-10-13 | End: 2023-10-20

## 2023-10-13 ASSESSMENT — ENCOUNTER SYMPTOMS
VOICE CHANGE: 0
SHORTNESS OF BREATH: 0
VOMITING: 0
COUGH: 0
SORE THROAT: 0
RHINORRHEA: 0
TROUBLE SWALLOWING: 0

## 2023-10-13 NOTE — PROGRESS NOTES
[facility] Otolaryngology  Dr. Nils Bean. Ms.Ed        Patient Name:  Akbar Cummings  :  1990     CHIEF C/O:    Chief Complaint   Patient presents with    Follow-up     Pt states the Astelin nasal spray has not helped. Pt states he used the Flonase in the mornings and Astelin spray at night pt states he still wakes up coughing up mucus trying to get it out of throat. HISTORY OBTAINED FROM:  patient    HISTORY OF PRESENT ILLNESS:       Lazara Swanson is a 26y.o. year old male, here today for follow up of:       Here for follow up for known history of chronic sinus complaints. Has been using flonase in the morning astlein at night and nasal irrigations. Hx of sinus surgery in January. Still complaint of right sided nasal congestion and mucus worse in the morning.             Past Medical History:   Diagnosis Date    Asthma     childhood    Kidney stones     Murmur     patient states this was as a child, he no longer has murmur     Past Surgical History:   Procedure Laterality Date    DENTAL SURGERY      LITHOTRIPSY      NOSE SURGERY Left 2023    LEFT LATERA performed by Dasha Malone DO at [address] [place] [address]    SEPTOPLASTY N/A 2023    SEPTOPLASTY, SUBMUCOUS RESECTION INFERIOR NASAL TURBINATES performed by Dasha Malone DO at [facility] Bilateral 2023    FUNCTIONAL ENDOSCOPIC SINUS SURGERY performed by Dasha Malone DO at [address] [place] [address]       Current Outpatient Medications:     fluticasone (FLONASE) 50 MCG/ACT nasal spray, 2 sprays by Nasal route daily 2 sprays each nostril once daily, Disp: 1 each, Rfl: 3    azelastine (ASTELIN) 0.1 % nasal spray, 2 sprays by Nasal route 2 times daily Use in each nostril as directed, Disp: 30 mL, Rfl: 1    neomycin-polymyxin-hydrocortisone (CORTISPORIN) 3.5-80359-7 otic suspension, , Disp: , Rfl:     azelastine (ASTELIN) 0.1 % nasal spray, 2 sprays by Nasal route 2 times daily Use in each nostril as directed, Disp: 30

## 2023-11-03 ENCOUNTER — OFFICE VISIT (OUTPATIENT)
Dept: ENT CLINIC | Age: 33
End: 2023-11-03
Payer: COMMERCIAL

## 2023-11-03 ENCOUNTER — TELEPHONE (OUTPATIENT)
Dept: ENT CLINIC | Age: 33
End: 2023-11-03

## 2023-11-03 VITALS
WEIGHT: 135 LBS | BODY MASS INDEX: 21.14 KG/M2 | DIASTOLIC BLOOD PRESSURE: 79 MMHG | HEART RATE: 49 BPM | SYSTOLIC BLOOD PRESSURE: 134 MMHG

## 2023-11-03 DIAGNOSIS — J30.9 ALLERGIC RHINITIS, UNSPECIFIED SEASONALITY, UNSPECIFIED TRIGGER: ICD-10-CM

## 2023-11-03 DIAGNOSIS — R09.81 NASAL CONGESTION: ICD-10-CM

## 2023-11-03 DIAGNOSIS — J30.89 NON-SEASONAL ALLERGIC RHINITIS DUE TO OTHER ALLERGIC TRIGGER: Primary | ICD-10-CM

## 2023-11-03 DIAGNOSIS — J01.41 ACUTE RECURRENT PANSINUSITIS: ICD-10-CM

## 2023-11-03 DIAGNOSIS — J32.0 CHRONIC RIGHT MAXILLARY SINUSITIS: ICD-10-CM

## 2023-11-03 DIAGNOSIS — J30.89 NON-SEASONAL ALLERGIC RHINITIS DUE TO OTHER ALLERGIC TRIGGER: ICD-10-CM

## 2023-11-03 PROCEDURE — 99213 OFFICE O/P EST LOW 20 MIN: CPT | Performed by: OTOLARYNGOLOGY

## 2023-11-03 RX ORDER — LEVOFLOXACIN 500 MG/1
500 TABLET, FILM COATED ORAL DAILY
Qty: 14 TABLET | Refills: 0 | Status: SHIPPED | OUTPATIENT
Start: 2023-11-03 | End: 2023-11-17

## 2023-11-03 ASSESSMENT — ENCOUNTER SYMPTOMS
SORE THROAT: 0
VOICE CHANGE: 0
COUGH: 0
VOMITING: 0
SHORTNESS OF BREATH: 0
RHINORRHEA: 0
TROUBLE SWALLOWING: 0

## 2023-11-03 NOTE — PROGRESS NOTES
[facility] Otolaryngology  Dr. Abel Dumont. Ms.Ed        Patient Name:  Alley Collins  :  1990     CHIEF C/O:    Chief Complaint   Patient presents with    Follow-up     Pt states he is still having a sinus infection, green mucus. HISTORY OBTAINED FROM:  patient    HISTORY OF PRESENT ILLNESS:       Sil Russo is a 26y.o. year old male, here today for follow up of:       Here for follow up for known history of chronic sinus complaints with acute onset of sinusitis. Just finished course of doxycycline and prednisone with saline rinses. He has continued green mucus and drainage. Was using flonase and astelin but just stopped because not making a difference. 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 He has never been allergy tested. Hx of sinus surgery in 2023. Still complaint of right sided nasal congestion and mucus worse in the morning.             Past Medical History:   Diagnosis Date    Asthma     childhood    Kidney stones     Murmur     patient states this was as a child, he no longer has murmur     Past Surgical History:   Procedure Laterality Date    DENTAL SURGERY      LITHOTRIPSY      NOSE SURGERY Left 2023    LEFT LATERA performed by Nadine Morales DO at [address] [place] [address]    SEPTOPLASTY N/A 2023    SEPTOPLASTY, SUBMUCOUS RESECTION INFERIOR NASAL TURBINATES performed by Nadine Morales DO at [facility] Bilateral 2023    FUNCTIONAL ENDOSCOPIC SINUS SURGERY performed by Nadine Morales DO at [address] [place] [address]       Current Outpatient Medications:     levoFLOXacin (LEVAQUIN) 500 MG tablet, Take 1 tablet by mouth daily for 14 days, Disp: 14 tablet, Rfl: 0    fluticasone (FLONASE) 50 MCG/ACT nasal spray, 2 sprays by Nasal route daily 2 sprays each nostril once daily, Disp: 1 each, Rfl: 3    azelastine (ASTELIN) 0.1 % nasal spray, 2 sprays by Nasal route 2 times daily Use in each nostril as directed, Disp: 30 mL, Rfl: 1

## 2023-11-03 NOTE — TELEPHONE ENCOUNTER
Called patients insurance to inquire about allergy testing coverage. Spoke with representative Steph Do reference number [id number] . The in-network deductible is $ 1000 which $ 1000 has been currently satisfied. The co-insurance limit is $ n/a which $ n/a has been currently satisfied. The maximum out of pocket is $ 2000 which $ 278.46 has been currently satisfied. Coverage for procedure codes are as follows: 83943: is  a covered benefit, no prior auth, limitations- none 85066: is  a covered benefit, no prior auth, limitations- none 11181: is  a covered benefit, no prior auth, limitations- 120 services per contract year 31314: is  a covered benefit, no prior auth, limitations- none 85394: is  a covered benefit, no prior auth, limitations- none. Provider is in network. Services are covered at 80% after deductible has been met. Called and notified patient of information listed above. Pt stated he will come to [facility] to complete allergy testing.

## 2023-11-12 LAB
SEND OUT REPORT: NORMAL
TEST NAME: NORMAL

## 2024-01-03 ENCOUNTER — OFFICE VISIT (OUTPATIENT)
Dept: PRIMARY CARE CLINIC | Age: 34
End: 2024-01-03
Payer: COMMERCIAL

## 2024-01-03 VITALS
TEMPERATURE: 98.8 F | BODY MASS INDEX: 20.78 KG/M2 | SYSTOLIC BLOOD PRESSURE: 102 MMHG | DIASTOLIC BLOOD PRESSURE: 68 MMHG | HEIGHT: 67 IN | HEART RATE: 89 BPM | OXYGEN SATURATION: 98 % | WEIGHT: 132.4 LBS | RESPIRATION RATE: 20 BRPM

## 2024-01-03 DIAGNOSIS — J10.1 INFLUENZA A: ICD-10-CM

## 2024-01-03 DIAGNOSIS — R05.9 COUGH IN ADULT PATIENT: ICD-10-CM

## 2024-01-03 LAB
INFLUENZA A ANTIGEN, POC: POSITIVE
INFLUENZA B ANTIGEN, POC: NEGATIVE
LOT EXPIRE DATE: NORMAL
LOT KIT NUMBER: NORMAL
SARS-COV-2, POC: NORMAL
VALID INTERNAL CONTROL: NORMAL
VENDOR AND KIT NAME POC: NORMAL

## 2024-01-03 PROCEDURE — 99213 OFFICE O/P EST LOW 20 MIN: CPT | Performed by: NURSE PRACTITIONER

## 2024-01-03 PROCEDURE — 87428 SARSCOV & INF VIR A&B AG IA: CPT | Performed by: NURSE PRACTITIONER

## 2024-01-03 RX ORDER — OSELTAMIVIR PHOSPHATE 75 MG/1
75 CAPSULE ORAL 2 TIMES DAILY
Qty: 10 CAPSULE | Refills: 0 | Status: SHIPPED | OUTPATIENT
Start: 2024-01-03 | End: 2024-01-08

## 2024-01-03 NOTE — PROGRESS NOTES
Chief Complaint:   Chest Congestion (Since Sunday )      History of Present Illness   Source of history provided by:  patient.      Nazario Panda is a 33 y.o. old male with a past medical history of:   Past Medical History:   Diagnosis Date    Asthma     childhood    Kidney stones     Murmur     patient states this was as a child, he no longer has murmur       Pt  [facility] with a cough/congestion for the past 1-2 days.  States the cough is   non productive      No fever noted.    Denies any N/V/D, abdominal pain, CP, progressive SOB, dizziness, or lethargy.           ROS    Unless otherwise stated in this report or unable to obtain because of the patient's clinical or mental status as evidenced by the medical record, this patients's positive and negative responses for Review of Systems, constitutional, psych, eyes, ENT, cardiovascular, respiratory, gastrointestinal, neurological, genitourinary, musculoskeletal, integument systems and systems related to the presenting problem are either stated in the preceding or were not pertinent or were negative for the symptoms and/or complaints related to the medical problem.    Past Surgical History:  has a past surgical history that includes Lithotripsy; Dental surgery; Sinus endoscopy (Bilateral, 1/12/2023); Septoplasty (N/A, 1/12/2023); and Nose surgery (Left, 1/12/2023).  Social History:  reports that he has never smoked. He has never used smokeless tobacco. He reports current alcohol use. He reports that he does not use drugs.  Family History: family history includes No Known Problems in his mother.   Allergies: Penicillins    Physical Exam         VS:  /68   Pulse 89   Temp 98.8 °F (37.1 °C)   Resp 20   Ht 1.702 m (5' 7\")   Wt 60.1 kg (132 lb 6.4 oz)   SpO2 98%   BMI 20.74 kg/m²    Oxygen Saturation Interpretation: Normal.    Constitutional:  Alert, development consistent with age.  Ears:  External Ears: Normal bilateral pinna.

## 2025-02-07 ENCOUNTER — OFFICE VISIT (OUTPATIENT)
Dept: ENT CLINIC | Age: 35
End: 2025-02-07
Payer: COMMERCIAL

## 2025-02-07 VITALS
SYSTOLIC BLOOD PRESSURE: 132 MMHG | BODY MASS INDEX: 21.66 KG/M2 | DIASTOLIC BLOOD PRESSURE: 58 MMHG | WEIGHT: 138 LBS | HEIGHT: 67 IN

## 2025-02-07 DIAGNOSIS — R09.81 NASAL CONGESTION: ICD-10-CM

## 2025-02-07 DIAGNOSIS — J30.89 NON-SEASONAL ALLERGIC RHINITIS DUE TO OTHER ALLERGIC TRIGGER: ICD-10-CM

## 2025-02-07 DIAGNOSIS — R09.82 POST-NASAL DRAINAGE: ICD-10-CM

## 2025-02-07 DIAGNOSIS — J01.41 ACUTE RECURRENT PANSINUSITIS: Primary | ICD-10-CM

## 2025-02-07 PROCEDURE — 99213 OFFICE O/P EST LOW 20 MIN: CPT | Performed by: NURSE PRACTITIONER

## 2025-02-07 RX ORDER — FLUTICASONE PROPIONATE 50 MCG
2 SPRAY, SUSPENSION (ML) NASAL DAILY
Qty: 1 EACH | Refills: 3 | Status: SHIPPED | OUTPATIENT
Start: 2025-02-07

## 2025-02-07 RX ORDER — METHYLPREDNISOLONE 4 MG/1
4 TABLET ORAL SEE ADMIN INSTRUCTIONS
Qty: 1 KIT | Refills: 0 | Status: SHIPPED | OUTPATIENT
Start: 2025-02-07 | End: 2025-02-13

## 2025-02-07 RX ORDER — AZELASTINE 1 MG/ML
2 SPRAY, METERED NASAL 2 TIMES DAILY
Qty: 30 ML | Refills: 1 | Status: SHIPPED | OUTPATIENT
Start: 2025-02-07

## 2025-02-07 RX ORDER — DOXYCYCLINE HYCLATE 100 MG
100 TABLET ORAL 2 TIMES DAILY
Qty: 20 TABLET | Refills: 0 | Status: SHIPPED | OUTPATIENT
Start: 2025-02-07 | End: 2025-02-17

## 2025-02-07 ASSESSMENT — ENCOUNTER SYMPTOMS
SHORTNESS OF BREATH: 0
RESPIRATORY NEGATIVE: 1
SINUS PAIN: 0
EYES NEGATIVE: 1
SINUS PRESSURE: 1
RHINORRHEA: 1
STRIDOR: 0

## 2025-02-07 NOTE — PROGRESS NOTES
DEPARTMENT OF OTOLARYNGOLOGY  Dr. Heber Mendez D.O., MsED  Dr. Ten Potts D.O.  Rajesh Antunez, MSN, FNP-C        Patient Name:  Nazario Panda  :  1990     CHIEF C/O:    Chief Complaint   Patient presents with    Follow-up     Ongoing sinus infection, uses sinus rinses 3 times daily, flonase/equate,        HISTORY OBTAINED FROM:  patient    HISTORY OF PRESENT ILLNESS:       Nazario is a 34 y.o. year old male, here today for follow up of:       Chronic sinus infections.  Patient was last seen by Dr. Mendez greater than 1 year ago.  Patient does have a history of chronic sinus infections and deviated septum which she did undergo surgery for in .  He complains of persistent pressure sensation on the right side of his sinuses with rhinorrhea and postnasal drainage.  He also has persistent congestion.  He has been doing his irrigations daily 2-3 times and is currently using Flonase.  He denies any recent fevers or recent antibiotics.  Denies any ear pain or pressure.  He does note some purulent drainage.           Past Medical History:   Diagnosis Date    Asthma     childhood    Kidney stones     Murmur     patient states this was as a child, he no longer has murmur     Past Surgical History:   Procedure Laterality Date    DENTAL SURGERY      LITHOTRIPSY      NOSE SURGERY Left 2023    LEFT LATERA performed by Heber Mendez DO at [facility] OR    SEPTOPLASTY N/A 2023    SEPTOPLASTY, SUBMUCOUS RESECTION INFERIOR NASAL TURBINATES performed by Heber Mendez DO at [facility] OR    SINUS ENDOSCOPY Bilateral 2023    FUNCTIONAL ENDOSCOPIC SINUS SURGERY performed by Heber Mendez DO at [facility] OR       Current Outpatient Medications:     azelastine (ASTELIN) 0.1 % nasal spray, 2 sprays by Nasal route 2 times daily Use in each nostril as directed, Disp: 30 mL, Rfl: 1    fluticasone (FLONASE) 50 MCG/ACT nasal spray, 2 sprays by Nasal route daily 2 sprays each

## 2025-03-14 ENCOUNTER — OFFICE VISIT (OUTPATIENT)
Dept: ENT CLINIC | Age: 35
End: 2025-03-14
Payer: COMMERCIAL

## 2025-03-14 VITALS
HEIGHT: 67 IN | WEIGHT: 138 LBS | DIASTOLIC BLOOD PRESSURE: 77 MMHG | BODY MASS INDEX: 21.66 KG/M2 | SYSTOLIC BLOOD PRESSURE: 132 MMHG | HEART RATE: 51 BPM

## 2025-03-14 DIAGNOSIS — J30.9 ALLERGIC RHINITIS, UNSPECIFIED SEASONALITY, UNSPECIFIED TRIGGER: ICD-10-CM

## 2025-03-14 DIAGNOSIS — R09.81 NASAL CONGESTION: ICD-10-CM

## 2025-03-14 DIAGNOSIS — J01.41 ACUTE RECURRENT PANSINUSITIS: Primary | ICD-10-CM

## 2025-03-14 DIAGNOSIS — R09.82 POST-NASAL DRAINAGE: ICD-10-CM

## 2025-03-14 PROCEDURE — 99214 OFFICE O/P EST MOD 30 MIN: CPT | Performed by: NURSE PRACTITIONER

## 2025-03-14 ASSESSMENT — ENCOUNTER SYMPTOMS
SINUS PRESSURE: 1
RHINORRHEA: 1
STRIDOR: 0
RESPIRATORY NEGATIVE: 1
EYES NEGATIVE: 1
SINUS PAIN: 0
SHORTNESS OF BREATH: 0

## 2025-03-14 NOTE — PROGRESS NOTES
DEPARTMENT OF OTOLARYNGOLOGY  Dr. Heber Mendez D.O., Ms. Ed.  Dr. Ten Potts D.O.  Rajesh Antunez, MSN, FNP-C        Patient Name:  Nazario Panda  :  1990     CHIEF C/O:    Chief Complaint   Patient presents with    Follow-up     1 mo sinus, no improvement,        HISTORY OBTAINED FROM:  patient    HISTORY OF PRESENT ILLNESS:       Nazario is a 35 y.o. year old male, here today for follow up of:       Nasal congestion and drainage, recurrent sinus infections.  Patient was last seen 6 weeks ago and started on doxycycline and a Medrol Dosepak for recurrent sinus infections.  He also continues using his Flonase and Astelin nasal sprays daily and recently started an over-the-counter allergy pill.  He also uses nasal irrigation twice daily.  He states recently with his nasal irrigation he has been getting thick chunky white discharge from the right nostril.  He continues to have persistent congestion with intermittent postnasal drainage and sinus pressure.  He denies any recent fevers.  He denies any ear pain or pressure.           Past Medical History:   Diagnosis Date    Asthma     childhood    Kidney stones     Murmur     patient states this was as a child, he no longer has murmur     Past Surgical History:   Procedure Laterality Date    DENTAL SURGERY      LITHOTRIPSY      NOSE SURGERY Left 2023    LEFT LATERA performed by Heber Mendez DO at [facility] OR    SEPTOPLASTY N/A 2023    SEPTOPLASTY, SUBMUCOUS RESECTION INFERIOR NASAL TURBINATES performed by Heber Mendez DO at [facility] OR    SINUS ENDOSCOPY Bilateral 2023    FUNCTIONAL ENDOSCOPIC SINUS SURGERY performed by Heber Mendez DO at [facility] OR       Current Outpatient Medications:     azelastine (ASTELIN) 0.1 % nasal spray, 2 sprays by Nasal route 2 times daily Use in each nostril as directed, Disp: 30 mL, Rfl: 1    fluticasone (FLONASE) 50 MCG/ACT nasal spray, 2 sprays by Nasal route daily 2 sprays

## 2025-03-15 LAB — Lab: NORMAL

## 2025-03-19 ENCOUNTER — TELEPHONE (OUTPATIENT)
Dept: ENT CLINIC | Age: 35
End: 2025-03-19

## 2025-03-21 ENCOUNTER — CLINICAL SUPPORT (OUTPATIENT)
Dept: ENT CLINIC | Age: 35
End: 2025-03-21

## 2025-03-21 DIAGNOSIS — J01.41 ACUTE RECURRENT PANSINUSITIS: Primary | ICD-10-CM

## 2025-03-23 LAB
CULTURE: ABNORMAL
CULTURE: ABNORMAL
SPECIMEN DESCRIPTION: ABNORMAL

## 2025-03-24 LAB
CULTURE: ABNORMAL
CULTURE: ABNORMAL
SPECIMEN DESCRIPTION: ABNORMAL

## 2025-03-25 ENCOUNTER — RESULTS FOLLOW-UP (OUTPATIENT)
Dept: ENT CLINIC | Age: 35
End: 2025-03-25

## 2025-03-25 RX ORDER — CIPROFLOXACIN 500 MG/1
500 TABLET, FILM COATED ORAL 2 TIMES DAILY
Qty: 20 TABLET | Refills: 0 | Status: SHIPPED | OUTPATIENT
Start: 2025-03-25 | End: 2025-04-04

## 2025-03-25 NOTE — PROGRESS NOTES
Patient presented to office for a repeat nasal swab. Swab was collected by GÉNESIS Antunez and sent to lab.

## 2025-04-25 ENCOUNTER — OFFICE VISIT (OUTPATIENT)
Dept: ENT CLINIC | Age: 35
End: 2025-04-25
Payer: COMMERCIAL

## 2025-04-25 VITALS
SYSTOLIC BLOOD PRESSURE: 144 MMHG | DIASTOLIC BLOOD PRESSURE: 83 MMHG | WEIGHT: 138 LBS | BODY MASS INDEX: 21.61 KG/M2 | HEART RATE: 67 BPM

## 2025-04-25 DIAGNOSIS — J01.41 ACUTE RECURRENT PANSINUSITIS: Primary | ICD-10-CM

## 2025-04-25 DIAGNOSIS — R09.81 NASAL CONGESTION: ICD-10-CM

## 2025-04-25 DIAGNOSIS — J30.9 ALLERGIC RHINITIS, UNSPECIFIED SEASONALITY, UNSPECIFIED TRIGGER: ICD-10-CM

## 2025-04-25 PROCEDURE — 99213 OFFICE O/P EST LOW 20 MIN: CPT | Performed by: NURSE PRACTITIONER

## 2025-04-25 ASSESSMENT — ENCOUNTER SYMPTOMS
STRIDOR: 0
SINUS PRESSURE: 1
SINUS PAIN: 0
RHINORRHEA: 0
SHORTNESS OF BREATH: 0
RESPIRATORY NEGATIVE: 1
EYES NEGATIVE: 1

## 2025-04-25 NOTE — PROGRESS NOTES
DEPARTMENT OF OTOLARYNGOLOGY  Dr. Heber Mendez D.O., Ms. Ed.  Dr. Ten Potts D.O.  Rajesh Antunez, MSN, FNP-C        Patient Name:  Nazario Panda  :  1990     CHIEF C/O:    Chief Complaint   Patient presents with    Follow-up     1 mo sinus, notices less mucous and less drainage,        HISTORY OBTAINED FROM:  patient    HISTORY OF PRESENT ILLNESS:       Nazario is a 35 y.o. year old male, here today for follow up of:       History of Present Illness  The patient presents for evaluation of chronic sinusitis.    He was last seen approximately a month ago, during which a nasal culture was performed. The results indicated the presence of Pseudomonas, leading to a 10-day course of Ciprofloxacin. He reports significant improvement in his condition, with reduced pressure, congestion, runny nose, and drainage. However, he continues to experience some deep-seated symptoms and has observed the expulsion of what appears to be the inner lining of his nose. His current treatment regimen includes nasal irrigations three times daily, Flonase, and Astelin. He also notes an improvement in his breathing.    MEDICATIONS  Current: Flonase, Astelin, Cipro        Past Medical History:   Diagnosis Date    Asthma     childhood    Kidney stones     Murmur     patient states this was as a child, he no longer has murmur     Past Surgical History:   Procedure Laterality Date    DENTAL SURGERY      LITHOTRIPSY      NOSE SURGERY Left 2023    LEFT LATERA performed by Heber Mendez DO at [facility] OR    SEPTOPLASTY N/A 2023    SEPTOPLASTY, SUBMUCOUS RESECTION INFERIOR NASAL TURBINATES performed by Heber Mendez DO at [facility] OR    SINUS ENDOSCOPY Bilateral 2023    FUNCTIONAL ENDOSCOPIC SINUS SURGERY performed by Heber Mendez DO at [facility] OR       Current Outpatient Medications:     azelastine (ASTELIN) 0.1 % nasal spray, 2 sprays by Nasal route 2 times daily Use in each nostril as

## 2025-05-23 ENCOUNTER — OFFICE VISIT (OUTPATIENT)
Dept: ENT CLINIC | Age: 35
End: 2025-05-23
Payer: COMMERCIAL

## 2025-05-23 VITALS
SYSTOLIC BLOOD PRESSURE: 136 MMHG | HEIGHT: 67 IN | WEIGHT: 138 LBS | HEART RATE: 66 BPM | BODY MASS INDEX: 21.66 KG/M2 | DIASTOLIC BLOOD PRESSURE: 75 MMHG

## 2025-05-23 DIAGNOSIS — J30.9 ALLERGIC RHINITIS, UNSPECIFIED SEASONALITY, UNSPECIFIED TRIGGER: ICD-10-CM

## 2025-05-23 DIAGNOSIS — R09.81 NASAL CONGESTION: ICD-10-CM

## 2025-05-23 DIAGNOSIS — J32.0 CHRONIC RIGHT MAXILLARY SINUSITIS: Primary | ICD-10-CM

## 2025-05-23 DIAGNOSIS — R09.82 POST-NASAL DRAINAGE: ICD-10-CM

## 2025-05-23 DIAGNOSIS — J01.41 ACUTE RECURRENT PANSINUSITIS: ICD-10-CM

## 2025-05-23 PROCEDURE — 99213 OFFICE O/P EST LOW 20 MIN: CPT | Performed by: NURSE PRACTITIONER

## 2025-05-23 RX ORDER — CIPROFLOXACIN 500 MG/1
500 TABLET, FILM COATED ORAL 2 TIMES DAILY
Qty: 28 TABLET | Refills: 0 | Status: SHIPPED | OUTPATIENT
Start: 2025-05-23 | End: 2025-06-06

## 2025-05-23 RX ORDER — AZELASTINE 1 MG/ML
2 SPRAY, METERED NASAL 2 TIMES DAILY
Qty: 90 ML | Refills: 1 | Status: SHIPPED | OUTPATIENT
Start: 2025-05-23

## 2025-05-23 RX ORDER — FLUTICASONE PROPIONATE 50 MCG
2 SPRAY, SUSPENSION (ML) NASAL DAILY
Qty: 3 EACH | Refills: 1 | Status: SHIPPED | OUTPATIENT
Start: 2025-05-23

## 2025-05-23 ASSESSMENT — ENCOUNTER SYMPTOMS
SINUS PRESSURE: 1
EYES NEGATIVE: 1
STRIDOR: 0
SINUS PAIN: 0
RHINORRHEA: 0
SHORTNESS OF BREATH: 0
RESPIRATORY NEGATIVE: 1

## 2025-05-23 NOTE — PROGRESS NOTES
Rfl: 0    fluticasone (FLONASE) 50 MCG/ACT nasal spray, 2 sprays by Nasal route daily 2 sprays each nostril once daily, Disp: 3 each, Rfl: 1    azelastine (ASTELIN) 0.1 % nasal spray, 2 sprays by Nasal route 2 times daily Use in each nostril as directed, Disp: 90 mL, Rfl: 1    neomycin-polymyxin-hydrocortisone (CORTISPORIN) 3.5-88481-3 otic suspension, , Disp: , Rfl:   Penicillins  Social History     Tobacco Use    Smoking status: Never    Smokeless tobacco: Never   Vaping Use    Vaping status: Never Used   Substance Use Topics    Alcohol use: Yes     Comment: rare    Drug use: No     Family History   Problem Relation Age of Onset    No Known Problems Mother        Review of Systems   Constitutional: Negative.  Negative for activity change and appetite change.   HENT:  Positive for congestion (minimal), postnasal drip and sinus pressure (minimal). Negative for ear discharge, ear pain, hearing loss, rhinorrhea and sinus pain.    Eyes: Negative.    Respiratory: Negative.  Negative for shortness of breath and stridor.    Cardiovascular: Negative.  Negative for chest pain and palpitations.   Endocrine: Negative.    Musculoskeletal: Negative.    Skin: Negative.    Neurological: Negative.  Negative for dizziness.   Hematological: Negative.    Psychiatric/Behavioral: Negative.         /75 (BP Site: Left Upper Arm, Patient Position: Sitting, BP Cuff Size: Medium Adult)   Pulse 66   Ht 1.702 m (5' 7\")   Wt 62.6 kg (138 lb)   BMI 21.61 kg/m²   Physical Exam  Constitutional:       Appearance: Normal appearance.   HENT:      Head: Normocephalic.      Right Ear: Tympanic membrane, ear canal and external ear normal. No drainage, swelling or tenderness.      Left Ear: Tympanic membrane, ear canal and external ear normal.      Ears:      Comments: Decreased drainage and swelling in right ear canal     Nose: No septal deviation or rhinorrhea.      Right Turbinates: Pale. Not swollen.      Left Turbinates: Pale. Not

## 2025-07-25 ENCOUNTER — OFFICE VISIT (OUTPATIENT)
Dept: ENT CLINIC | Age: 35
End: 2025-07-25
Payer: COMMERCIAL

## 2025-07-25 VITALS
OXYGEN SATURATION: 98 % | HEIGHT: 67 IN | DIASTOLIC BLOOD PRESSURE: 79 MMHG | HEART RATE: 59 BPM | WEIGHT: 140 LBS | BODY MASS INDEX: 21.97 KG/M2 | SYSTOLIC BLOOD PRESSURE: 121 MMHG

## 2025-07-25 DIAGNOSIS — J32.0 CHRONIC RIGHT MAXILLARY SINUSITIS: Primary | ICD-10-CM

## 2025-07-25 DIAGNOSIS — R09.81 NASAL CONGESTION: ICD-10-CM

## 2025-07-25 DIAGNOSIS — R09.82 POST-NASAL DRAINAGE: ICD-10-CM

## 2025-07-25 DIAGNOSIS — J30.9 ALLERGIC RHINITIS, UNSPECIFIED SEASONALITY, UNSPECIFIED TRIGGER: ICD-10-CM

## 2025-07-25 PROCEDURE — 99213 OFFICE O/P EST LOW 20 MIN: CPT | Performed by: NURSE PRACTITIONER

## 2025-07-25 RX ORDER — LEVOCETIRIZINE DIHYDROCHLORIDE 5 MG/1
5 TABLET, FILM COATED ORAL NIGHTLY
Qty: 30 TABLET | Refills: 2 | Status: SHIPPED | OUTPATIENT
Start: 2025-07-25 | End: 2025-10-23

## 2025-07-25 ASSESSMENT — ENCOUNTER SYMPTOMS
RESPIRATORY NEGATIVE: 1
RHINORRHEA: 0
EYES NEGATIVE: 1
STRIDOR: 0
SHORTNESS OF BREATH: 0
SINUS PAIN: 0
SINUS PRESSURE: 1

## 2025-07-25 NOTE — PROGRESS NOTES
DEPARTMENT OF OTOLARYNGOLOGY  Dr. Heber Mendez D.O., Ms. Ed.  Dr. Ten Potts D.O.  Rajesh Antunez, MSN, FNP-C        Patient Name:  Nazario Panda  :  1990     CHIEF C/O:    Chief Complaint   Patient presents with    Follow-up     3 month sinus       HISTORY OBTAINED FROM:  patient    HISTORY OF PRESENT ILLNESS:       Nazario is a 35 y.o. year old male, here today for follow up of:       History of Present Illness  The patient is a 35-year-old male who presents for chronic sinus problems.    He was last seen approximately 2 months ago. He has been experiencing chronic right maxillary sinusitis. During his last visit, a culture was performed, and he was prescribed Cipro, which led to some improvement. He continues to use Flonase and Astelin. Despite completing another course of Cipro, he still experiences sinus pressure and drainage. He reports sneezing multiple times after trimming an arborvitae yesterday. He had previously taken antihistamines such as Claritin or Zyrtec but has not used them recently. Allergy testing revealed some reactivity to environmental factors such as weeds and grasses, although no true allergies were identified. 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        Past Medical History:   Diagnosis Date    Asthma     childhood    Kidney stones     Murmur     patient states this was as a child, he no longer has murmur     Past Surgical History:   Procedure Laterality Date    DENTAL SURGERY      LITHOTRIPSY      NOSE SURGERY Left 2023    LEFT LATERA performed by Heber Mendez DO at [facility] OR    SEPTOPLASTY N/A 2023    SEPTOPLASTY, SUBMUCOUS RESECTION INFERIOR NASAL TURBINATES performed by Heber Mendez DO at [facility] OR    SINUS ENDOSCOPY Bilateral 2023    FUNCTIONAL ENDOSCOPIC SINUS SURGERY performed by Heber Mendez DO at [facility] OR       Current Outpatient Medications:     levocetirizine (XYZAL) 5 MG tablet, Take 1

## (undated) DEVICE — 1810 FOAM BLOCK NEEDLE COUNTER: Brand: DEVON

## (undated) DEVICE — GAUZE,SPONGE,4"X4",16PLY,XRAY,STRL,LF: Brand: MEDLINE

## (undated) DEVICE — MICRO TIP WIPE: Brand: DEVON

## (undated) DEVICE — DOUBLE BASIN SET: Brand: MEDLINE INDUSTRIES, INC.

## (undated) DEVICE — PEN: MARKING STD 100/CS: Brand: MEDICAL ACTION INDUSTRIES

## (undated) DEVICE — GARMENT,MEDLINE,DVT,INT,CALF,MED, GEN2: Brand: MEDLINE

## (undated) DEVICE — INTENDED FOR TISSUE SEPARATION, AND OTHER PROCEDURES THAT REQUIRE A SHARP SURGICAL BLADE TO PUNCTURE OR CUT.: Brand: BARD-PARKER ® STAINLESS STEEL BLADES

## (undated) DEVICE — SYSTEM SINUPLASTY BAL L16MM DIA6MM SPINPLUS NAV RELIEVA

## (undated) DEVICE — TURBINATOR WAND: Brand: COBLATION

## (undated) DEVICE — CONTROL SYRINGE LUER-LOCK TIP: Brand: MONOJECT

## (undated) DEVICE — SUTURE NONABSORBABLE MONOFILAMENT 3-0 PS-1 18 IN BLK ETHILON 1663H

## (undated) DEVICE — LITE & COOL SURGICAL MASK: Brand: PRECEPT®

## (undated) DEVICE — 3M™ TEGADERM™ TRANSPARENT FILM DRESSING FRAME STYLE, 1624W, 2-3/8 IN X 2-3/4 IN (6 CM X 7 CM), 100/CT 4CT/CASE: Brand: 3M™ TEGADERM™

## (undated) DEVICE — MEDI-VAC NON-CONDUCTIVE SUCTION TUBING: Brand: CARDINAL HEALTH

## (undated) DEVICE — TOWEL OR BLUEE 16X26IN ST 8 PACK ORB08 16X26ORTWL

## (undated) DEVICE — SPLINT 1524055 DOYLE II AIRWAY SET: Brand: DOYLE II ™

## (undated) DEVICE — SINU FOAM: Brand: SINU-FOAM

## (undated) DEVICE — SOLUTION IRRIG 1000ML 09% SOD CHL USP PIC PLAS CONTAINER

## (undated) DEVICE — 4-PORT MANIFOLD: Brand: NEPTUNE 2

## (undated) DEVICE — NEEDLE HYPO 18GA L1.5IN PNK POLYPR HUB S STL THN WALL FILL

## (undated) DEVICE — COMFORT-CONE™ SURGICAL MASK: Brand: PRECEPT®

## (undated) DEVICE — HEAD AND NECK PACK: Brand: CONVERTORS

## (undated) DEVICE — STERILE POLYISOPRENE POWDER-FREE SURGICAL GLOVES WITH EMOLLIENT COATING: Brand: PROTEXIS

## (undated) DEVICE — PACKING 8004007 NEURAY 200PK 13X76MM: Brand: NEURAY ®

## (undated) DEVICE — SYRINGE MED 50ML LUERLOCK TIP

## (undated) DEVICE — 12 ML SYRINGE,LUER-LOCK TIP: Brand: MONOJECT

## (undated) DEVICE — NEEDLE HYPO 25GA L1.5IN BLU POLYPR HUB S STL REG BVL STR

## (undated) DEVICE — ANTI-FOG SOLUTION WITH FOAM PAD: Brand: DEVON

## (undated) DEVICE — SOLUTION IV IRRIG POUR BRL 0.9% SODIUM CHL 2F7124